# Patient Record
Sex: FEMALE | Race: WHITE | NOT HISPANIC OR LATINO | Employment: OTHER | URBAN - NONMETROPOLITAN AREA
[De-identification: names, ages, dates, MRNs, and addresses within clinical notes are randomized per-mention and may not be internally consistent; named-entity substitution may affect disease eponyms.]

---

## 2017-01-09 ENCOUNTER — TRANSCRIBE ORDERS (OUTPATIENT)
Dept: ADMINISTRATIVE | Facility: HOSPITAL | Age: 62
End: 2017-01-09

## 2017-01-09 DIAGNOSIS — Z13.9 SCREENING: Primary | ICD-10-CM

## 2017-01-31 ENCOUNTER — APPOINTMENT (OUTPATIENT)
Dept: MAMMOGRAPHY | Facility: HOSPITAL | Age: 62
End: 2017-01-31

## 2017-10-31 ENCOUNTER — OFFICE VISIT (OUTPATIENT)
Dept: INTERNAL MEDICINE | Facility: CLINIC | Age: 62
End: 2017-10-31

## 2017-10-31 VITALS
RESPIRATION RATE: 12 BRPM | WEIGHT: 170 LBS | HEART RATE: 60 BPM | BODY MASS INDEX: 28.32 KG/M2 | SYSTOLIC BLOOD PRESSURE: 118 MMHG | TEMPERATURE: 99 F | OXYGEN SATURATION: 97 % | DIASTOLIC BLOOD PRESSURE: 70 MMHG | HEIGHT: 65 IN

## 2017-10-31 DIAGNOSIS — E27.40 ADRENAL INSUFFICIENCY (HCC): ICD-10-CM

## 2017-10-31 DIAGNOSIS — R53.83 FATIGUE, UNSPECIFIED TYPE: ICD-10-CM

## 2017-10-31 DIAGNOSIS — R00.1 BRADYCARDIA: Primary | ICD-10-CM

## 2017-10-31 DIAGNOSIS — R00.2 PALPITATIONS: ICD-10-CM

## 2017-10-31 DIAGNOSIS — R74.8 ELEVATED LIVER ENZYMES: ICD-10-CM

## 2017-10-31 DIAGNOSIS — Z12.39 BREAST CANCER SCREENING: ICD-10-CM

## 2017-10-31 PROCEDURE — 93000 ELECTROCARDIOGRAM COMPLETE: CPT | Performed by: FAMILY MEDICINE

## 2017-10-31 PROCEDURE — 93225 XTRNL ECG REC<48 HRS REC: CPT | Performed by: FAMILY MEDICINE

## 2017-10-31 PROCEDURE — 99204 OFFICE O/P NEW MOD 45 MIN: CPT | Performed by: FAMILY MEDICINE

## 2017-11-01 ENCOUNTER — CLINICAL SUPPORT (OUTPATIENT)
Dept: INTERNAL MEDICINE | Facility: CLINIC | Age: 62
End: 2017-11-01

## 2017-11-01 ENCOUNTER — FLU SHOT (OUTPATIENT)
Dept: INTERNAL MEDICINE | Facility: CLINIC | Age: 62
End: 2017-11-01

## 2017-11-01 DIAGNOSIS — Z23 NEED FOR INFLUENZA VACCINE: ICD-10-CM

## 2017-11-01 DIAGNOSIS — R00.1 BRADYCARDIA: ICD-10-CM

## 2017-11-01 PROCEDURE — 90686 IIV4 VACC NO PRSV 0.5 ML IM: CPT | Performed by: FAMILY MEDICINE

## 2017-11-01 PROCEDURE — 90471 IMMUNIZATION ADMIN: CPT | Performed by: FAMILY MEDICINE

## 2017-11-03 LAB
ALBUMIN SERPL-MCNC: 4.3 G/DL (ref 3.5–5)
ALBUMIN/GLOB SERPL: 2 G/DL (ref 1–2)
ALDOST SERPL-MCNC: 2.5 NG/DL (ref 0–30)
ALDOST/RENIN PLAS-RTO: 12.3 {RATIO} (ref 0–30)
ALP SERPL-CCNC: 74 U/L (ref 38–126)
ALT SERPL-CCNC: 82 U/L (ref 13–69)
ANA SER QL: NEGATIVE
AST SERPL-CCNC: 54 U/L (ref 15–46)
BASOPHILS # BLD AUTO: 0.04 10*3/MM3 (ref 0–0.2)
BASOPHILS NFR BLD AUTO: 0.7 % (ref 0–2.5)
BILIRUB SERPL-MCNC: 0.5 MG/DL (ref 0.2–1.3)
BUN SERPL-MCNC: 12 MG/DL (ref 7–20)
BUN/CREAT SERPL: 17.1 (ref 7.1–23.5)
CALCIUM SERPL-MCNC: 10.2 MG/DL (ref 8.4–10.2)
CHLORIDE SERPL-SCNC: 107 MMOL/L (ref 98–107)
CO2 SERPL-SCNC: 27 MMOL/L (ref 26–30)
CREAT SERPL-MCNC: 0.7 MG/DL (ref 0.6–1.3)
CRP SERPL-MCNC: <0.5 MG/DL (ref 0–1)
EOSINOPHIL # BLD AUTO: 0.08 10*3/MM3 (ref 0–0.7)
EOSINOPHIL NFR BLD AUTO: 1.4 % (ref 0–7)
ERYTHROCYTE [DISTWIDTH] IN BLOOD BY AUTOMATED COUNT: 12.6 % (ref 11.5–14.5)
ERYTHROCYTE [SEDIMENTATION RATE] IN BLOOD BY WESTERGREN METHOD: 3 MM/HR (ref 0–20)
FOLATE SERPL-MCNC: 19.2 NG/ML
GFR SERPLBLD CREATININE-BSD FMLA CKD-EPI: 103 ML/MIN/1.73
GFR SERPLBLD CREATININE-BSD FMLA CKD-EPI: 85 ML/MIN/1.73
GLOBULIN SER CALC-MCNC: 2.2 GM/DL
GLUCOSE SERPL-MCNC: 94 MG/DL (ref 74–98)
HCT VFR BLD AUTO: 41.1 % (ref 37–47)
HGB BLD-MCNC: 13.8 G/DL (ref 12–16)
IMM GRANULOCYTES # BLD: 0.01 10*3/MM3 (ref 0–0.06)
IMM GRANULOCYTES NFR BLD: 0.2 % (ref 0–0.6)
INTACT PTH: NORMAL
LYMPHOCYTES # BLD AUTO: 1.38 10*3/MM3 (ref 0.6–3.4)
LYMPHOCYTES NFR BLD AUTO: 23.5 % (ref 10–50)
MAGNESIUM SERPL-MCNC: 2.4 MG/DL (ref 1.6–2.3)
MCH RBC QN AUTO: 30.2 PG (ref 27–31)
MCHC RBC AUTO-ENTMCNC: 33.6 G/DL (ref 30–37)
MCV RBC AUTO: 89.9 FL (ref 81–99)
MONOCYTES # BLD AUTO: 0.38 10*3/MM3 (ref 0–0.9)
MONOCYTES NFR BLD AUTO: 6.5 % (ref 0–12)
NEUTROPHILS # BLD AUTO: 3.97 10*3/MM3 (ref 2–6.9)
NEUTROPHILS NFR BLD AUTO: 67.7 % (ref 37–80)
NRBC BLD AUTO-RTO: 0 /100 WBC (ref 0–0)
PHOSPHATE SERPL-MCNC: 3.9 MG/DL (ref 2.5–4.5)
PLATELET # BLD AUTO: 215 10*3/MM3 (ref 130–400)
POTASSIUM SERPL-SCNC: 4.3 MMOL/L (ref 3.5–5.1)
PROT SERPL-MCNC: 6.5 G/DL (ref 6.3–8.2)
PTH-INTACT SERPL-MCNC: 39 PG/ML (ref 15–65)
RBC # BLD AUTO: 4.57 10*6/MM3 (ref 4.2–5.4)
RENIN PLAS-CCNC: 0.2 NG/ML/HR (ref 0.17–5.38)
SODIUM SERPL-SCNC: 143 MMOL/L (ref 137–145)
T3 SERPL-MCNC: 83 NG/DL (ref 71–180)
T4 FREE SERPL-MCNC: 1.11 NG/DL (ref 0.78–2.19)
TSH RECEP AB SER-ACNC: <0.5 IU/L (ref 0–1.75)
TSH SERPL DL<=0.005 MIU/L-ACNC: 1.11 MIU/ML (ref 0.47–4.68)
VIT B12 SERPL-MCNC: 989 PG/ML (ref 239–931)
WBC # BLD AUTO: 5.86 10*3/MM3 (ref 4.8–10.8)

## 2017-11-05 ENCOUNTER — TELEPHONE (OUTPATIENT)
Dept: INTERNAL MEDICINE | Facility: CLINIC | Age: 62
End: 2017-11-05

## 2017-11-05 DIAGNOSIS — R74.01 TRANSAMINITIS: Primary | ICD-10-CM

## 2017-11-09 LAB
FERRITIN SERPL-MCNC: 51.6 NG/ML (ref 11.1–264)
HBV CORE AB SERPL QL IA: NEGATIVE
HBV CORE IGM SERPL QL IA: NEGATIVE
HBV E AB SERPL QL IA: NEGATIVE
HBV E AG SERPL QL IA: NEGATIVE
HBV SURFACE AB SER QL: REACTIVE
HBV SURFACE AG SERPL QL IA: NEGATIVE
IRON SATN MFR SERPL: 29 % SATURATION
IRON SATN MFR SERPL: 35 % (ref 11–46)
IRON SERPL-MCNC: 123 MCG/DL (ref 37–181)
IRON SERPL-MCNC: 123 UG/DL
TIBC SERPL-MCNC: 351 MCG/DL (ref 261–497)
TRANSFERRIN SERPL-MCNC: 306 MG/DL
UIBC SERPL-MCNC: 228 MCG/DL

## 2017-11-13 ENCOUNTER — HOSPITAL ENCOUNTER (OUTPATIENT)
Dept: ULTRASOUND IMAGING | Facility: HOSPITAL | Age: 62
Discharge: HOME | End: 2017-11-13
Attending: FAMILY MEDICINE | Admitting: FAMILY MEDICINE

## 2017-11-13 DIAGNOSIS — R74.01 TRANSAMINITIS: ICD-10-CM

## 2017-11-13 PROCEDURE — 76705 ECHO EXAM OF ABDOMEN: CPT

## 2017-11-20 DIAGNOSIS — R16.0 LIVER MASS: Primary | ICD-10-CM

## 2017-11-20 PROCEDURE — 93227 XTRNL ECG REC<48 HR R&I: CPT | Performed by: FAMILY MEDICINE

## 2017-11-27 ENCOUNTER — OFFICE VISIT (OUTPATIENT)
Dept: INTERNAL MEDICINE | Facility: CLINIC | Age: 62
End: 2017-11-27

## 2017-11-27 VITALS
HEART RATE: 60 BPM | RESPIRATION RATE: 12 BRPM | TEMPERATURE: 98.2 F | WEIGHT: 171 LBS | BODY MASS INDEX: 28.49 KG/M2 | SYSTOLIC BLOOD PRESSURE: 118 MMHG | DIASTOLIC BLOOD PRESSURE: 70 MMHG | HEIGHT: 65 IN | OXYGEN SATURATION: 96 %

## 2017-11-27 DIAGNOSIS — R16.0 LIVER MASS: ICD-10-CM

## 2017-11-27 DIAGNOSIS — R94.31 ABNORMAL HOLTER MONITOR FINDING: ICD-10-CM

## 2017-11-27 DIAGNOSIS — R00.9 ABNORMAL HEART RATE: ICD-10-CM

## 2017-11-27 DIAGNOSIS — I47.1 SVT (SUPRAVENTRICULAR TACHYCARDIA) (HCC): ICD-10-CM

## 2017-11-27 DIAGNOSIS — J30.89 CHRONIC NON-SEASONAL ALLERGIC RHINITIS, UNSPECIFIED TRIGGER: Primary | ICD-10-CM

## 2017-11-27 PROCEDURE — 99214 OFFICE O/P EST MOD 30 MIN: CPT | Performed by: FAMILY MEDICINE

## 2017-11-27 NOTE — PROGRESS NOTES
Subjective    Emmie Ji is a 62 y.o. female here for:  Chief Complaint   Patient presents with   • Follow-up     4 week follow up on palpitations, fatigue and slow heartrate   • Slow Heart Rate     History of Present Illness   Continues to have high and low heart rates. She's measured in 40s at times at rest. She also feels palpitations at times. She reports a strong family history of heart disease.     Patient also is hesitant to have her CT liver. She is hesitant due to radiation exposure. Has history of elevated liver enzymes, but no known mass prior to recent ultrasound. Negative hepatitis.     Also notes a runny nose that is relieved somewhat by rhinocort over the counter that she does on occasion. She has not used on a consistent basis daily but has need for it year round with allergies to outdoor and indoor allergens.     The following portions of the patient's history were reviewed and updated as appropriate: allergies, current medications, past family history, past medical history, past social history, past surgical history and problem list.    Review of Systems   Constitutional: Positive for fatigue.   Cardiovascular: Positive for palpitations.   Gastrointestinal: Negative for abdominal pain.       Vitals:    11/27/17 0904   BP: 118/70   Pulse: 60   Resp: 12   Temp: 98.2 °F (36.8 °C)   SpO2: 96%       Objective   Physical Exam   Constitutional: She is oriented to person, place, and time. Vital signs are normal. She appears well-developed and well-nourished. She is active. She does not have a sickly appearance. She does not appear ill.   Appears stated age. Well groomed.    HENT:   Head: Normocephalic and atraumatic. Hair is normal.   Right Ear: Hearing normal.   Left Ear: Hearing normal.   Nose: Nose normal.   Eyes: EOM and lids are normal. Pupils are equal, round, and reactive to light. No scleral icterus.   Neck: Phonation normal. Neck supple.   Cardiovascular: Regular rhythm and normal heart  sounds.  Bradycardia present.  Exam reveals no gallop and no friction rub.    No murmur heard.  Pulmonary/Chest: Effort normal and breath sounds normal.   Musculoskeletal: She exhibits no deformity.   Neurological: She is alert and oriented to person, place, and time. She displays no tremor. No cranial nerve deficit. Gait normal.   Skin: Skin is warm. No rash noted. She is not diaphoretic. No cyanosis. Nails show no clubbing.   Psychiatric: She has a normal mood and affect. Her speech is normal and behavior is normal. Judgment and thought content normal. Cognition and memory are normal.   Nursing note and vitals reviewed.    Lab Results   Component Value Date    TSH 1.110 10/31/2017       Chemistry        Component Value Date/Time     10/31/2017 1444    K 4.3 10/31/2017 1444     10/31/2017 1444    CO2 27.0 10/31/2017 1444    BUN 12 10/31/2017 1444    CREATININE 0.70 10/31/2017 1444    GLU 94 10/31/2017 1444        Component Value Date/Time    CALCIUM 10.2 10/31/2017 1444    ALKPHOS 74 10/31/2017 1444    AST 54 (H) 10/31/2017 1444    ALT 82 (H) 10/31/2017 1444    BILITOT 0.5 10/31/2017 1444        Lab Results   Component Value Date    WBC 5.86 10/31/2017    HGB 13.8 10/31/2017    HCT 41.1 10/31/2017    MCV 89.9 10/31/2017     10/31/2017       Assessment/Plan   Emmie was seen today for follow-up and slow heart rate.    Diagnoses and all orders for this visit:    Chronic non-seasonal allergic rhinitis, unspecified trigger  -     budesonide (RINOCORT AQUA) 32 MCG/ACT nasal spray; 1 spray into each nostril Daily.    SVT (supraventricular tachycardia)    Abnormal heart rate    Abnormal Holter monitor finding    Liver mass      Reviewed u/s that showed a hypoechoic liver lesion. Suggest patient proceed with the additional liver protocol imaging to rule out malignancy or potential to transform to malignancy. Referred to Dr. Gaspar, referral order is in a telephone encounter from earlier today. Discussed  Holter result with her, discussed a couple of runs of SVT.          Enid Alfaro MD

## 2017-12-05 ENCOUNTER — CONSULT (OUTPATIENT)
Dept: CARDIOLOGY | Facility: CLINIC | Age: 62
End: 2017-12-05

## 2017-12-05 ENCOUNTER — APPOINTMENT (OUTPATIENT)
Dept: CT IMAGING | Facility: HOSPITAL | Age: 62
End: 2017-12-05

## 2017-12-05 VITALS
HEIGHT: 65 IN | BODY MASS INDEX: 28.46 KG/M2 | OXYGEN SATURATION: 98 % | SYSTOLIC BLOOD PRESSURE: 112 MMHG | RESPIRATION RATE: 18 BRPM | DIASTOLIC BLOOD PRESSURE: 62 MMHG | WEIGHT: 170.8 LBS | HEART RATE: 64 BPM

## 2017-12-05 DIAGNOSIS — R00.2 PALPITATIONS: ICD-10-CM

## 2017-12-05 DIAGNOSIS — R00.1 BRADYCARDIA: ICD-10-CM

## 2017-12-05 DIAGNOSIS — R07.2 PRECORDIAL PAIN: Primary | ICD-10-CM

## 2017-12-05 DIAGNOSIS — E27.40: ICD-10-CM

## 2017-12-05 PROCEDURE — 99204 OFFICE O/P NEW MOD 45 MIN: CPT | Performed by: INTERNAL MEDICINE

## 2017-12-05 RX ORDER — UBIDECARENONE 100 MG
100 CAPSULE ORAL DAILY
COMMUNITY
End: 2022-08-25

## 2017-12-05 NOTE — PROGRESS NOTES
Subjective:     Encounter Date:12/05/2017      Patient ID: Emmie Ji is a 62 y.o. female.    Chief Complaint:Fatigue, bradycardia, chest discomfort and palpitations  HPI  This is a 62-year-old female patient with a history of adrenal insufficiency who presents to cardiology clinic with severe fatigue.  The patient indicates that her fatigue was evaluated by a Holter monitor which showed multiple episodes of bradycardia into the 40 beat per minute range.  This degree of bradycardia occurred during both waking hours as well as sleep.  She is not on any medication that would result in bradycardia.  The patient indicates that previous thyroid function studies have been normal.  She indicates that she was on hormone replacement therapy at one point after her diagnosis of adrenal insufficiency but is currently not on any medication.  She indicates that she felt much better when taking hormone replacement therapy.  The patient also reports having chest discomfort of approximately 3 years duration.  The discomfort occurs several times per week and will generally last 4-5 seconds per episode.  The discomfort is located in her central chest and has a pressure-like quality.  It does not radiate.  There is no associated symptoms.  The discomfort typically has a 4/10 intensity.  The discomfort is usually precipitated by physical activity and exertion and is generally relieved with rest.  The frequency and duration are quite variable.  She denies having shortness of breath.  There is no shortness of breath at rest or with activity.  She has no orthopnea PND or lower extremity edema.  Her palpitations are described as a sense that her heart is racing and fluttering.  This will generally lasts for no more than 5-6 seconds per episode.  The palpitations are much less frequent than her sense of fatigue.  Her Holter monitor showed 2 episodes of supraventricular tachycardia with heart rates up to 170 bpm.  She has some  dizziness but no syncope.  She reports some swelling in her fingers.  She indicates that she is cutting back on her caffeine intake.  She does not smoke or drink.  The following portions of the patient's history were reviewed and updated as appropriate: allergies, current medications, past family history, past medical history, past social history, past surgical history and problem  Review of Systems   Constitution: Positive for weakness and malaise/fatigue. Negative for chills, diaphoresis, fever, night sweats, weight gain and weight loss.   HENT: Negative for ear discharge, hearing loss, hoarse voice and nosebleeds.    Eyes: Negative for discharge, double vision, pain and photophobia.   Cardiovascular: Positive for chest pain, irregular heartbeat and palpitations. Negative for claudication, cyanosis, dyspnea on exertion, leg swelling, near-syncope, orthopnea, paroxysmal nocturnal dyspnea and syncope.   Respiratory: Negative for cough, hemoptysis, shortness of breath, sputum production and wheezing.    Endocrine: Negative for cold intolerance, heat intolerance, polydipsia, polyphagia and polyuria.   Hematologic/Lymphatic: Negative for adenopathy and bleeding problem. Does not bruise/bleed easily.   Skin: Negative for color change, flushing, itching and rash.   Musculoskeletal: Negative for muscle cramps, muscle weakness, myalgias and stiffness.   Gastrointestinal: Negative for abdominal pain, diarrhea, hematemesis, hematochezia, nausea and vomiting.   Genitourinary: Negative for dysuria, frequency and nocturia.   Neurological: Positive for dizziness. Negative for focal weakness, loss of balance, numbness, paresthesias and seizures.   Psychiatric/Behavioral: Negative for altered mental status, hallucinations and suicidal ideas.   Allergic/Immunologic: Negative for HIV exposure, hives and persistent infections.       Current Outpatient Prescriptions:   •  budesonide (RINOCORT AQUA) 32 MCG/ACT nasal spray, 1 spray into  "each nostril Daily., Disp: 3 bottle, Rfl: 3  •  coenzyme Q10 100 MG capsule, Take 100 mg by mouth Daily., Disp: , Rfl:      Objective:     Physical Exam   Constitutional: She is oriented to person, place, and time. She appears well-developed and well-nourished.   HENT:   Head: Normocephalic and atraumatic.   Mouth/Throat: Oropharynx is clear and moist.   Eyes: Conjunctivae and EOM are normal. Pupils are equal, round, and reactive to light. No scleral icterus.   Neck: Normal range of motion. Neck supple. No JVD present. No tracheal deviation present. No thyromegaly present.   Cardiovascular: Normal rate, regular rhythm, S1 normal, S2 normal, normal heart sounds, intact distal pulses and normal pulses.  PMI is not displaced.  Exam reveals no gallop and no friction rub.    No murmur heard.  Pulmonary/Chest: Effort normal and breath sounds normal. No respiratory distress. She has no wheezes. She has no rales.   Abdominal: Soft. Bowel sounds are normal. She exhibits no distension and no mass. There is no tenderness. There is no rebound and no guarding.   Musculoskeletal: Normal range of motion. She exhibits no edema or deformity.   Neurological: She is alert and oriented to person, place, and time. She displays normal reflexes. No cranial nerve deficit. Coordination normal.   Skin: Skin is warm and dry. No rash noted. No erythema.   Psychiatric: She has a normal mood and affect. Her behavior is normal. Thought content normal.     Blood pressure 112/62, pulse 64, resp. rate 18, height 165.1 cm (65\"), weight 77.5 kg (170 lb 12.8 oz), SpO2 98 %.   Lab Review:       Assessment:         1. Precordial pain  The patient's chest discomfort has features mostly atypical for coronary insufficiency.  She has little in the way of risk factors for premature coronary disease.  She has never had a stress test.  - Treadmill Stress Test    2. Palpitations  The patient appears to be experiencing both symptomatic supraventricular " tachycardia as well as bradycardia.  The bradycardia appears to be the most prominent feature.  I suspect there is an element of tachycardia-bradycardia syndrome.  - Adult Transthoracic Echo Complete W/ Cont if Necessary Per Protocol    3. 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  It will be difficult to treat her bradycardia until after she has been started on appropriate hormonal replacement therapy.  - Treadmill Stress Test  - Adult Transthoracic Echo Complete W/ Cont if Necessary Per Protocol    4. Adrenal cortex hypofunction  The patient indicates that she was diagnosed with decreased adrenal gland function several years ago.  At one point she was on hormone replacement therapy but now is on no therapy at all.  Some of her severe fatigue as well as bradycardia is likely related to this diagnosis.  Procedures     Plan:       I have recommended the patient see an endocrinologist to consider starting appropriate adrenal gland hormone replacement therapy.  If the patient's symptoms persist particularly with bradycardia after appropriate hormone therapy she may require placement of a permanent pacemaker.  In addition the patient may require a permanent pacemaker in order to treat her supraventricular tachycardia.  Most AV node blocking medications as well as antiarrhythmic drugs would exacerbate underlying bradycardia in this circumstance.  She may need a permanent pacemaker for backup support in order to use appropriate medical therapy for the tachycardia.  I have recommended a treadmill exercise stress test to evaluate her chest discomfort as well as an echocardiogram.  No changes in her medication profile have been made at today's visit.  Further recommendations will be predicated on the results of her outpatient testing.

## 2017-12-08 ENCOUNTER — APPOINTMENT (OUTPATIENT)
Dept: MAMMOGRAPHY | Facility: HOSPITAL | Age: 62
End: 2017-12-08

## 2017-12-08 LAB
BH CV ECHO MEAS - % IVS THICK: 27.3 %
BH CV ECHO MEAS - % LVPW THICK: 37.5 %
BH CV ECHO MEAS - AO MAX PG (FULL): 0.28 MMHG
BH CV ECHO MEAS - AO MAX PG: 7 MMHG
BH CV ECHO MEAS - AO MEAN PG (FULL): 1 MMHG
BH CV ECHO MEAS - AO MEAN PG: 3 MMHG
BH CV ECHO MEAS - AO ROOT AREA (BSA CORRECTED): 1.4
BH CV ECHO MEAS - AO ROOT AREA: 4.9 CM^2
BH CV ECHO MEAS - AO ROOT DIAM: 2.5 CM
BH CV ECHO MEAS - AO V2 MAX: 128.5 CM/SEC
BH CV ECHO MEAS - AO V2 MEAN: 87.6 CM/SEC
BH CV ECHO MEAS - AO V2 VTI: 25.5 CM
BH CV ECHO MEAS - AVA(I,A): 3.1 CM^2
BH CV ECHO MEAS - AVA(I,D): 3.1 CM^2
BH CV ECHO MEAS - AVA(V,A): 3.2 CM^2
BH CV ECHO MEAS - AVA(V,D): 3.2 CM^2
BH CV ECHO MEAS - BSA(HAYCOCK): 1.9 M^2
BH CV ECHO MEAS - BSA: 1.8 M^2
BH CV ECHO MEAS - BZI_BMI: 28.3 KILOGRAMS/M^2
BH CV ECHO MEAS - BZI_METRIC_HEIGHT: 165.1 CM
BH CV ECHO MEAS - BZI_METRIC_WEIGHT: 77.1 KG
BH CV ECHO MEAS - CONTRAST EF 4CH: 59.3 ML/M^2
BH CV ECHO MEAS - EDV(CUBED): 107.2 ML
BH CV ECHO MEAS - EDV(MOD-SP4): 113 ML
BH CV ECHO MEAS - EDV(TEICH): 104.9 ML
BH CV ECHO MEAS - EF(CUBED): 74.8 %
BH CV ECHO MEAS - EF(MOD-SP4): 59.3 %
BH CV ECHO MEAS - EF(TEICH): 66.6 %
BH CV ECHO MEAS - ESV(CUBED): 27 ML
BH CV ECHO MEAS - ESV(MOD-SP4): 46 ML
BH CV ECHO MEAS - ESV(TEICH): 35 ML
BH CV ECHO MEAS - FS: 36.8 %
BH CV ECHO MEAS - IVS/LVPW: 1.4
BH CV ECHO MEAS - IVSD: 1 CM
BH CV ECHO MEAS - IVSS: 1.4 CM
BH CV ECHO MEAS - LA DIMENSION: 3.9 CM
BH CV ECHO MEAS - LA/AO: 1.6
BH CV ECHO MEAS - LV DIASTOLIC VOL/BSA (35-75): 61.2 ML/M^2
BH CV ECHO MEAS - LV MASS(C)D: 156.1 GRAMS
BH CV ECHO MEAS - LV MASS(C)DI: 84.6 GRAMS/M^2
BH CV ECHO MEAS - LV MASS(C)S: 116.6 GRAMS
BH CV ECHO MEAS - LV MASS(C)SI: 63.1 GRAMS/M^2
BH CV ECHO MEAS - LV MAX PG: 6.7 MMHG
BH CV ECHO MEAS - LV MEAN PG: 2 MMHG
BH CV ECHO MEAS - LV SYSTOLIC VOL/BSA (12-30): 24.9 ML/M^2
BH CV ECHO MEAS - LV V1 MAX: 129.5 CM/SEC
BH CV ECHO MEAS - LV V1 MEAN: 67.1 CM/SEC
BH CV ECHO MEAS - LV V1 VTI: 25 CM
BH CV ECHO MEAS - LVIDD: 4.8 CM
BH CV ECHO MEAS - LVIDS: 3 CM
BH CV ECHO MEAS - LVLD AP4: 7.9 CM
BH CV ECHO MEAS - LVLS AP4: 6.6 CM
BH CV ECHO MEAS - LVOT AREA (M): 3.1 CM^2
BH CV ECHO MEAS - LVOT AREA: 3.1 CM^2
BH CV ECHO MEAS - LVOT DIAM: 2 CM
BH CV ECHO MEAS - LVPWD: 0.8 CM
BH CV ECHO MEAS - LVPWS: 1.1 CM
BH CV ECHO MEAS - MV A MAX VEL: 63.9 CM/SEC
BH CV ECHO MEAS - MV DEC SLOPE: 209.5 CM/SEC^2
BH CV ECHO MEAS - MV DEC TIME: 0.29 SEC
BH CV ECHO MEAS - MV E MAX VEL: 68.1 CM/SEC
BH CV ECHO MEAS - MV E/A: 1.1
BH CV ECHO MEAS - MV P1/2T MAX VEL: 72.9 CM/SEC
BH CV ECHO MEAS - MV P1/2T: 101.9 MSEC
BH CV ECHO MEAS - MVA P1/2T LCG: 3 CM^2
BH CV ECHO MEAS - MVA(P1/2T): 2.2 CM^2
BH CV ECHO MEAS - PA MAX PG: 3.2 MMHG
BH CV ECHO MEAS - PA V2 MAX: 89.4 CM/SEC
BH CV ECHO MEAS - RAP SYSTOLE: 10 MMHG
BH CV ECHO MEAS - RVSP: 33 MMHG
BH CV ECHO MEAS - SI(AO): 67.8 ML/M^2
BH CV ECHO MEAS - SI(CUBED): 43.4 ML/M^2
BH CV ECHO MEAS - SI(LVOT): 42.5 ML/M^2
BH CV ECHO MEAS - SI(MOD-SP4): 36.3 ML/M^2
BH CV ECHO MEAS - SI(TEICH): 37.9 ML/M^2
BH CV ECHO MEAS - SV(AO): 125.2 ML
BH CV ECHO MEAS - SV(CUBED): 80.2 ML
BH CV ECHO MEAS - SV(LVOT): 78.5 ML
BH CV ECHO MEAS - SV(MOD-SP4): 67 ML
BH CV ECHO MEAS - SV(TEICH): 69.9 ML
BH CV ECHO MEAS - TR MAX VEL: 241 CM/SEC
BH CV ECHO MEAS - TV MAX PG: 0.55 MMHG
BH CV ECHO MEAS - TV V2 MAX: 37.1 CM/SEC
BH CV STRESS DURATION MIN STAGE 1: 3
BH CV STRESS DURATION SEC STAGE 1: 0
BH CV STRESS GRADE STAGE 1: 10
BH CV STRESS METS STAGE 1: 5
BH CV STRESS PROTOCOL 1: ABNORMAL
BH CV STRESS RECOVERY BP: ABNORMAL MMHG
BH CV STRESS RECOVERY HR: 65 BPM
BH CV STRESS RECOVERY O2: 99 %
BH CV STRESS SPEED STAGE 1: 1.7
BH CV STRESS STAGE 1: 1
LEFT ATRIUM VOLUME INDEX: 25 ML/M2
LV EF 2D ECHO EST: 60 %
MAXIMAL PREDICTED HEART RATE: 158 BPM
PERCENT MAX PREDICTED HR: 87.34 %
STRESS BASELINE BP: ABNORMAL MMHG
STRESS BASELINE HR: 61 BPM
STRESS O2 SAT REST: 99 %
STRESS PERCENT HR: 103 %
STRESS POST ESTIMATED WORKLOAD: 10.1 METS
STRESS POST EXERCISE DUR MIN: 9 MIN
STRESS POST O2 SAT PEAK: 97 %
STRESS POST PEAK BP: ABNORMAL MMHG
STRESS POST PEAK HR: 138 BPM
STRESS TARGET HR: 134 BPM

## 2017-12-09 ENCOUNTER — HOSPITAL ENCOUNTER (OUTPATIENT)
Dept: MAMMOGRAPHY | Facility: HOSPITAL | Age: 62
Discharge: HOME | End: 2017-12-09
Admitting: FAMILY MEDICINE

## 2017-12-09 DIAGNOSIS — Z12.39 BREAST CANCER SCREENING: ICD-10-CM

## 2017-12-09 PROCEDURE — 77063 BREAST TOMOSYNTHESIS BI: CPT

## 2017-12-09 PROCEDURE — G0202 SCR MAMMO BI INCL CAD: HCPCS

## 2017-12-11 ENCOUNTER — TELEPHONE (OUTPATIENT)
Dept: INTERNAL MEDICINE | Facility: CLINIC | Age: 62
End: 2017-12-11

## 2017-12-11 DIAGNOSIS — E27.40: Primary | ICD-10-CM

## 2017-12-11 DIAGNOSIS — R00.1 BRADYCARDIA: ICD-10-CM

## 2017-12-11 DIAGNOSIS — R07.2 PRECORDIAL PAIN: Primary | ICD-10-CM

## 2017-12-11 DIAGNOSIS — R00.2 PALPITATIONS: ICD-10-CM

## 2017-12-11 NOTE — TELEPHONE ENCOUNTER
Endo order in. Does she wish to do the cardiac testing already ordered first or get a second opinion first (cardiology)?

## 2017-12-11 NOTE — TELEPHONE ENCOUNTER
Per Dr. Gaspar, patient needs a referral to see an Endocrinologist.     She also wants a second opinion for Cardiology, wants to stay within [facility].

## 2017-12-12 ENCOUNTER — APPOINTMENT (OUTPATIENT)
Dept: CT IMAGING | Facility: HOSPITAL | Age: 62
End: 2017-12-12

## 2017-12-28 ENCOUNTER — APPOINTMENT (OUTPATIENT)
Dept: CT IMAGING | Facility: HOSPITAL | Age: 62
End: 2017-12-28

## 2018-01-08 ENCOUNTER — CONSULT (OUTPATIENT)
Dept: CARDIOLOGY | Facility: CLINIC | Age: 63
End: 2018-01-08

## 2018-01-08 VITALS
HEART RATE: 59 BPM | SYSTOLIC BLOOD PRESSURE: 130 MMHG | HEIGHT: 65 IN | DIASTOLIC BLOOD PRESSURE: 68 MMHG | WEIGHT: 169 LBS | BODY MASS INDEX: 28.16 KG/M2

## 2018-01-08 DIAGNOSIS — E78.2 MIXED HYPERLIPIDEMIA: Primary | ICD-10-CM

## 2018-01-08 DIAGNOSIS — R00.2 PALPITATIONS: ICD-10-CM

## 2018-01-08 DIAGNOSIS — R07.2 PRECORDIAL PAIN: ICD-10-CM

## 2018-01-08 DIAGNOSIS — R00.1 BRADYCARDIA: ICD-10-CM

## 2018-01-08 PROCEDURE — 99213 OFFICE O/P EST LOW 20 MIN: CPT | Performed by: INTERNAL MEDICINE

## 2018-01-08 PROCEDURE — 93000 ELECTROCARDIOGRAM COMPLETE: CPT | Performed by: INTERNAL MEDICINE

## 2018-01-08 RX ORDER — VITAMIN B COMPLEX
CAPSULE ORAL DAILY
COMMUNITY
End: 2018-03-16

## 2018-01-08 NOTE — PROGRESS NOTES
[facility]  Office visit  Emmie Ji  1955  [phone]  [phone]  VISIT DATE:  01/08/2018    PCP: Enid Alfaro MD  [address] [place] [id number]    CC:  Chief Complaint   Patient presents with   • Chest Pain       ASSESSMENT:   Diagnosis Plan   1. Mixed hyperlipidemia     2. Palpitations     3. Bradycardia     4. Precordial pain  Adult Stress Echo W/ Cont or Stress Agent if Necessary Per Protocol       PLAN:  -Stress echocardiogram pending to evaluate chest discomfort and recent electrically abnormal stress test  -Endocrinology evaluation pending  -Agree with Dr. barakat regarding potential underlying sick sinus physiology which appears relatively asymptomatic.  We'll need clinical follow-up..  Difficult to predict if she will progress to the point of requiring permanent pacing.  Agree that any potential underlying endocrine issues need to be addressed.    Subjective  62-year-old female who is previously diagnosed with adrenal insufficiency via cortisol saliva samples.  Had been on homeopathic adrenal supplementation previously.  Reports that she has not felt good since her complete hysterectomy 2001.  Intermittent fatigue.  Intermittent chest discomfort which she feels is a mild pressure sensation in her precordium.  This is usually brought on by emotional stress.  She leads an active lifestyle, never has chest discomfort with exertion.  Reviewed most recent cardiac testing to include Holter monitor, exercise treadmill test and transthoracic echocardiogram.  Blood pressures run less than 140/90 mmHg.  Dr. barakat recommended a Lexiscan myocardial perfusion imaging after her exercise stress test which revealed 1 mm of ST depression.  He was concerned for a false positive electrical response and a middle-aged female which I completely agree with.  She is very concerned about the potential negative health risk of an exposure to radiation.  We  "potentially discussed stress echocardiography.  I did review her transthoracic echo imaging which at times is sub-optimal for definitive endocardial definition.  She may require IV echo contrast during a stress echocardiogram.  She was even nervous about having IV echo contrast injected.  Stressed the overall safety of this intervention.  It appears she has at least initially agreed to proceed with further evaluation with stress echocardiogram.      PHYSICAL EXAMINATION:  Vitals:    01/08/18 0904   BP: 130/68   BP Location: Left arm   Patient Position: Sitting   Pulse: 59   Weight: 76.7 kg (169 lb)   Height: 165.1 cm (65\")     General Appearance:    Alert, cooperative, no distress, appears stated age   Head:    Normocephalic, without obvious abnormality, atraumatic   Eyes:    conjunctiva/corneas clear   Nose:   Nares normal, septum midline, mucosa normal, no drainage   Throat:   Lips, teeth and gums normal   Neck:   Supple, symmetrical, trachea midline, no carotid    bruit or JVD   Lungs:     Clear to auscultation bilaterally, respirations unlabored   Chest Wall:    No tenderness or deformity    Heart:    Regular rate and rhythm, S1 and S2 normal, no murmur, rub   or gallop, normal carotid impulse bilaterally without bruit.   Abdomen:     Soft, non-tender   Extremities:   Extremities normal, atraumatic, no cyanosis or edema   Pulses:   2+ and symmetric all extremities   Skin:   Skin color, texture, turgor normal, no rashes or lesions       Diagnostic Data:    ECG 12 Lead  Date/Time: 1/8/2018 9:36 AM  Performed by: BAN DEAN III  Authorized by: BAN DEAN III   Previous ECG: no previous ECG available  Rhythm: sinus bradycardia  Rate: bradycardic  Clinical impression: non-specific ECG          No results found for: CHLPL, TRIG, HDL, LDLDIRECT  Lab Results   Component Value Date    BUN 12 10/31/2017    CREATININE 0.70 10/31/2017     10/31/2017    K 4.3 10/31/2017     10/31/2017    CO2 27.0 10/31/2017 "     No results found for: HGBA1C  Lab Results   Component Value Date    WBC 5.86 10/31/2017    HGB 13.8 10/31/2017    HCT 41.1 10/31/2017     10/31/2017       Allergies  Allergies   Allergen Reactions   • Demerol [Meperidine]    • Erythromycin    • Morphine And Related    • Penicillins    • Percocet [Oxycodone-Acetaminophen]    • Sulfa Antibiotics    • Epinephrine Anxiety       Current Medications    Current Outpatient Prescriptions:   •  B Complex Vitamins (VITAMIN B COMPLEX) capsule capsule, Take  by mouth Daily., Disp: , Rfl:   •  Cholecalciferol (VITAMIN D3) 5000 units capsule capsule, Take 5,000 Units by mouth Daily., Disp: , Rfl:   •  coenzyme Q10 100 MG capsule, Take 100 mg by mouth Daily., Disp: , Rfl:   •  Multiple Vitamin (MULTI VITAMIN DAILY PO), Take  by mouth Daily., Disp: , Rfl:           ROS  Review of Systems   Cardiovascular: Positive for chest pain. Negative for dyspnea on exertion, irregular heartbeat and palpitations.   Respiratory: Positive for shortness of breath.      SOCIAL HX  Social History     Social History   • Marital status:      Spouse name: N/A   • Number of children: N/A   • Years of education: N/A     Occupational History   • Not on file.     Social History Main Topics   • Smoking status: Never Smoker   • Smokeless tobacco: Never Used   • Alcohol use No   • Drug use: No   • Sexual activity: No     Other Topics Concern   • Not on file     Social History Narrative       FAMILY HX  Family History   Problem Relation Age of Onset   • Heart attack Mother    • Cancer Mother      bladder   • Hypertension Mother    • Hyperlipidemia Mother    • Glaucoma Father    • Bradycardia Father    • Rheum arthritis Sister    • Mental illness Brother      schizophrenia   • Tuberculosis Son    • Stroke Paternal Grandmother    • Thyroid disease Paternal Grandmother    • Osteoporosis Sister    • Migraines Sister    • Mental illness Sister      schizophrenia   • Cancer Sister 43     breast   •  Liver disease Sister      SAGE   • Obesity Sister    • Cancer Sister    • Liver disease Sister    • Mental illness Sister    • Mental illness Brother              Avelino Pizarro III, MD, FACC

## 2018-01-26 PROBLEM — E27.40 ADRENAL INSUFFICIENCY (HCC): Status: ACTIVE | Noted: 2018-01-26

## 2018-01-29 ENCOUNTER — HOSPITAL ENCOUNTER (OUTPATIENT)
Dept: CARDIOLOGY | Facility: HOSPITAL | Age: 63
Discharge: HOME | End: 2018-01-29
Attending: INTERNAL MEDICINE | Admitting: INTERNAL MEDICINE

## 2018-01-29 DIAGNOSIS — R07.2 PRECORDIAL PAIN: ICD-10-CM

## 2018-01-29 LAB
BH CV ECHO MEAS - BSA(HAYCOCK): 1.9 M^2
BH CV ECHO MEAS - BSA: 1.8 M^2
BH CV ECHO MEAS - BZI_BMI: 27.5 KILOGRAMS/M^2
BH CV ECHO MEAS - BZI_METRIC_HEIGHT: 165.1 CM
BH CV ECHO MEAS - BZI_METRIC_WEIGHT: 74.8 KG
BH CV STRESS BP STAGE 1: NORMAL
BH CV STRESS BP STAGE 2: NORMAL
BH CV STRESS BP STAGE 3: NORMAL
BH CV STRESS BP STAGE 4: NORMAL
BH CV STRESS DURATION MIN STAGE 1: 3
BH CV STRESS DURATION MIN STAGE 2: 3
BH CV STRESS DURATION MIN STAGE 3: 3
BH CV STRESS DURATION SEC STAGE 1: 0
BH CV STRESS DURATION SEC STAGE 2: 0
BH CV STRESS DURATION SEC STAGE 3: 0
BH CV STRESS DURATION SEC STAGE 4: 21
BH CV STRESS GRADE STAGE 1: 10
BH CV STRESS GRADE STAGE 2: 12
BH CV STRESS GRADE STAGE 3: 14
BH CV STRESS GRADE STAGE 4: 16
BH CV STRESS HR STAGE 1: 93
BH CV STRESS HR STAGE 2: 105
BH CV STRESS HR STAGE 3: 134
BH CV STRESS HR STAGE 4: 144
BH CV STRESS METS STAGE 1: 5
BH CV STRESS METS STAGE 2: 7.5
BH CV STRESS METS STAGE 3: 10
BH CV STRESS METS STAGE 4: 13.5
BH CV STRESS PROTOCOL 1: NORMAL
BH CV STRESS RECOVERY BP: NORMAL MMHG
BH CV STRESS RECOVERY HR: 70 BPM
BH CV STRESS SPEED STAGE 1: 1.7
BH CV STRESS SPEED STAGE 2: 2.5
BH CV STRESS SPEED STAGE 3: 3.4
BH CV STRESS SPEED STAGE 4: 4.2
BH CV STRESS STAGE 1: 1
BH CV STRESS STAGE 2: 2
BH CV STRESS STAGE 3: 3
BH CV STRESS STAGE 4: 4
BH CV VAS BP RIGHT ARM: NORMAL MMHG
MAXIMAL PREDICTED HEART RATE: 158 BPM
PERCENT MAX PREDICTED HR: 92.41 %
STRESS BASELINE BP: NORMAL MMHG
STRESS BASELINE HR: 60 BPM
STRESS PERCENT HR: 109 %
STRESS POST ESTIMATED WORKLOAD: 11.2 METS
STRESS POST EXERCISE DUR MIN: 9 MIN
STRESS POST EXERCISE DUR SEC: 21 SEC
STRESS POST PEAK BP: NORMAL MMHG
STRESS POST PEAK HR: 146 BPM
STRESS TARGET HR: 134 BPM

## 2018-01-29 PROCEDURE — 93320 DOPPLER ECHO COMPLETE: CPT

## 2018-01-29 PROCEDURE — 93350 STRESS TTE ONLY: CPT | Performed by: INTERNAL MEDICINE

## 2018-01-29 PROCEDURE — 93017 CV STRESS TEST TRACING ONLY: CPT

## 2018-01-29 PROCEDURE — 93325 DOPPLER ECHO COLOR FLOW MAPG: CPT

## 2018-01-29 PROCEDURE — 93350 STRESS TTE ONLY: CPT

## 2018-01-29 PROCEDURE — 93018 CV STRESS TEST I&R ONLY: CPT | Performed by: INTERNAL MEDICINE

## 2018-01-30 ENCOUNTER — HOSPITAL ENCOUNTER (OUTPATIENT)
Dept: INFUSION THERAPY | Facility: HOSPITAL | Age: 63
Setting detail: INFUSION SERIES
Discharge: HOME | End: 2018-01-30

## 2018-01-30 VITALS
HEART RATE: 55 BPM | TEMPERATURE: 97.9 F | OXYGEN SATURATION: 97 % | SYSTOLIC BLOOD PRESSURE: 130 MMHG | RESPIRATION RATE: 18 BRPM | DIASTOLIC BLOOD PRESSURE: 69 MMHG

## 2018-01-30 VITALS — WEIGHT: 165 LBS | BODY MASS INDEX: 27.49 KG/M2 | HEIGHT: 65 IN

## 2018-01-30 DIAGNOSIS — E27.40 ADRENAL INSUFFICIENCY (HCC): ICD-10-CM

## 2018-01-30 PROCEDURE — 82024 ASSAY OF ACTH: CPT | Performed by: INTERNAL MEDICINE

## 2018-01-30 PROCEDURE — 25010000002 COSYNTROPIN PER 0.25 MG: Performed by: INTERNAL MEDICINE

## 2018-01-30 PROCEDURE — 96374 THER/PROPH/DIAG INJ IV PUSH: CPT

## 2018-01-30 PROCEDURE — 82533 TOTAL CORTISOL: CPT | Performed by: INTERNAL MEDICINE

## 2018-01-30 PROCEDURE — 36415 COLL VENOUS BLD VENIPUNCTURE: CPT

## 2018-01-30 RX ORDER — AMPICILLIN TRIHYDRATE 250 MG
600 CAPSULE ORAL DAILY
COMMUNITY
End: 2018-03-16

## 2018-01-30 RX ORDER — COSYNTROPIN 0.25 MG/ML
0.25 INJECTION, POWDER, FOR SOLUTION INTRAMUSCULAR; INTRAVENOUS ONCE
Status: CANCELLED | OUTPATIENT
Start: 2018-01-30

## 2018-01-30 RX ORDER — COSYNTROPIN 0.25 MG/ML
0.25 INJECTION, POWDER, FOR SOLUTION INTRAMUSCULAR; INTRAVENOUS ONCE
Status: COMPLETED | OUTPATIENT
Start: 2018-01-30 | End: 2018-01-30

## 2018-01-30 RX ORDER — COSYNTROPIN 0.25 MG/ML
0.25 INJECTION, POWDER, FOR SOLUTION INTRAMUSCULAR; INTRAVENOUS ONCE
Status: DISCONTINUED | OUTPATIENT
Start: 2018-01-30 | End: 2018-01-30 | Stop reason: SDUPTHER

## 2018-01-30 RX ORDER — SODIUM CHLORIDE 9 MG/ML
250 INJECTION, SOLUTION INTRAVENOUS ONCE
Status: DISCONTINUED | OUTPATIENT
Start: 2018-01-30 | End: 2018-02-01 | Stop reason: HOSPADM

## 2018-01-30 RX ORDER — SODIUM CHLORIDE 9 MG/ML
250 INJECTION, SOLUTION INTRAVENOUS ONCE
Status: CANCELLED | OUTPATIENT
Start: 2018-01-30

## 2018-01-30 RX ADMIN — COSYNTROPIN 0.25 MG: 0.25 INJECTION, POWDER, LYOPHILIZED, FOR SOLUTION INTRAMUSCULAR; INTRAVENOUS at 09:56

## 2018-01-31 LAB
ACTH PLAS-MCNC: 16.1 PG/ML (ref 7.2–63.3)
CORTIS SERPL-MCNC: 21.6 UG/DL
CORTIS SERPL-MCNC: 23.9 UG/DL
CORTIS SERPL-MCNC: 8 UG/DL

## 2018-02-14 ENCOUNTER — APPOINTMENT (OUTPATIENT)
Dept: LAB | Facility: HOSPITAL | Age: 63
End: 2018-02-14

## 2018-02-14 ENCOUNTER — TRANSCRIBE ORDERS (OUTPATIENT)
Dept: ADMINISTRATIVE | Facility: HOSPITAL | Age: 63
End: 2018-02-14

## 2018-02-14 DIAGNOSIS — E27.40 ADRENOCORTICAL INSUFFICIENCY (HCC): Primary | ICD-10-CM

## 2018-02-14 LAB
T4 FREE SERPL-MCNC: 1.02 NG/DL (ref 0.78–2.19)
TSH SERPL DL<=0.05 MIU/L-ACNC: 2.66 MIU/ML (ref 0.47–4.68)

## 2018-02-14 PROCEDURE — 84443 ASSAY THYROID STIM HORMONE: CPT | Performed by: INTERNAL MEDICINE

## 2018-02-14 PROCEDURE — 84480 ASSAY TRIIODOTHYRONINE (T3): CPT | Performed by: INTERNAL MEDICINE

## 2018-02-14 PROCEDURE — 84439 ASSAY OF FREE THYROXINE: CPT | Performed by: INTERNAL MEDICINE

## 2018-02-14 PROCEDURE — 36415 COLL VENOUS BLD VENIPUNCTURE: CPT | Performed by: INTERNAL MEDICINE

## 2018-02-15 LAB — T3 SERPL-MCNC: 96 NG/DL (ref 71–180)

## 2018-03-16 ENCOUNTER — OFFICE VISIT (OUTPATIENT)
Dept: INTERNAL MEDICINE | Facility: CLINIC | Age: 63
End: 2018-03-16

## 2018-03-16 VITALS
SYSTOLIC BLOOD PRESSURE: 120 MMHG | WEIGHT: 169 LBS | DIASTOLIC BLOOD PRESSURE: 72 MMHG | HEIGHT: 65 IN | RESPIRATION RATE: 12 BRPM | HEART RATE: 50 BPM | BODY MASS INDEX: 28.16 KG/M2 | OXYGEN SATURATION: 99 % | TEMPERATURE: 98.6 F

## 2018-03-16 DIAGNOSIS — Z23 NEED FOR ZOSTER VACCINE: ICD-10-CM

## 2018-03-16 DIAGNOSIS — E78.2 MIXED HYPERLIPIDEMIA: ICD-10-CM

## 2018-03-16 DIAGNOSIS — R00.1 BRADYCARDIA: Primary | ICD-10-CM

## 2018-03-16 PROCEDURE — 99213 OFFICE O/P EST LOW 20 MIN: CPT | Performed by: FAMILY MEDICINE

## 2018-03-16 RX ORDER — ASCORBIC ACID 500 MG
500 TABLET ORAL 2 TIMES DAILY
COMMUNITY

## 2018-03-16 NOTE — PROGRESS NOTES
"Subjective    Emmie Ji is a 62 y.o. female here for:  Chief Complaint   Patient presents with   • Hyperlipidemia     History of Present Illness     Saw endocrinology and ACTH was normal but cortisol was low. No treatment at this time.     Saw Dr. Pizarro regarding bradycardia, she has follow up scheduled. Heart rates at home have been in 60s. She's not feeling as bad but she's also not working quite as hard recently. Historically had been doing 12 hour days five days a week.     Not fasting this AM.     The following portions of the patient's history were reviewed and updated as appropriate: allergies, current medications, past family history, past medical history, past social history, past surgical history and problem list.    Review of Systems   Constitutional: Positive for activity change (less work recently) and fatigue (not as bad).   Cardiovascular: Negative for chest pain.       Vitals:    03/16/18 0755   BP: 120/72   Pulse: 50   Resp: 12   Temp: 98.6 °F (37 °C)   SpO2: 99%   Weight: 76.7 kg (169 lb)   Height: 165.1 cm (65\")         Objective   Physical Exam   Constitutional: She is oriented to person, place, and time. Vital signs are normal. She appears well-developed and well-nourished. She is active.  Non-toxic appearance. She does not have a sickly appearance. She does not appear ill. No distress. She appears overweight.   HENT:   Head: Normocephalic and atraumatic. Hair is normal.   Right Ear: Hearing and external ear normal.   Left Ear: Hearing and external ear normal.   Nose: Nose normal.   Mouth/Throat: Mucous membranes are not dry.   Eyes: EOM and lids are normal. Pupils are equal, round, and reactive to light. No scleral icterus.   Neck: Neck supple.   Cardiovascular: Normal rate, regular rhythm and normal heart sounds.    No murmur heard.  Pulmonary/Chest: Effort normal and breath sounds normal.   Musculoskeletal: She exhibits no edema or deformity.   Neurological: She is alert and oriented " to person, place, and time. She displays no tremor. No cranial nerve deficit. Gait normal.   Skin: Skin is warm and dry. No rash noted. She is not diaphoretic. No cyanosis. Nails show no clubbing.   Psychiatric: She has a normal mood and affect. Her speech is normal and behavior is normal. Judgment and thought content normal. Cognition and memory are normal.   Nursing note and vitals reviewed.        Assessment/Plan     Problem List Items Addressed This Visit        Cardiovascular and Mediastinum    Hyperlipidemia     Lipid abnormalities are unchanged.  Need to reassess lipids when she can have labs fasting.  Lipids will be reassessed when she can return for labs.   · Continue Co-Q10         Relevant Orders    Comprehensive Metabolic Panel    Lipid Panel    Bradycardia - Primary     · Follow up with Dr. Pizarro for likely sick sinus  · Monitor heart rates  · Avoid beta blockers         Relevant Orders    Comprehensive Metabolic Panel    Magnesium      Other Visit Diagnoses     Need for zoster vaccine        Relevant Medications    Zoster Vac Recomb Adjuvanted 50 MCG reconstituted suspension            · Prescription sent for Shingrix. Vaccination discussed in detail including efficacy compared to Zostavax and need for two injections two months apart. Discussed likely myalgias after vaccination as greater than 40% of patients complained of this in clinical trials. If not covered by insurance, recommend waiting until it is covered (if not affordable). Even if Zostavax was previously received, Shingrix is recommended.  ·     Return in about 6 months (around 9/16/2018) for Annual physical.    Enid Alfaro MD    Please note that portions of this note may have been completed with a voice recognition program. Efforts were made to edit dictation, but occasionally words are mistranscribed.

## 2018-03-16 NOTE — ASSESSMENT & PLAN NOTE
Lipid abnormalities are unchanged.  Need to reassess lipids when she can have labs fasting.  Lipids will be reassessed when she can return for labs.   · Continue Co-Q10

## 2018-04-05 ENCOUNTER — OFFICE VISIT (OUTPATIENT)
Dept: INTERNAL MEDICINE | Facility: CLINIC | Age: 63
End: 2018-04-05

## 2018-04-05 VITALS
TEMPERATURE: 98 F | HEART RATE: 59 BPM | SYSTOLIC BLOOD PRESSURE: 136 MMHG | BODY MASS INDEX: 28.16 KG/M2 | WEIGHT: 169 LBS | HEIGHT: 65 IN | DIASTOLIC BLOOD PRESSURE: 77 MMHG | OXYGEN SATURATION: 98 %

## 2018-04-05 DIAGNOSIS — J01.00 ACUTE MAXILLARY SINUSITIS, RECURRENCE NOT SPECIFIED: Primary | ICD-10-CM

## 2018-04-05 PROCEDURE — 99213 OFFICE O/P EST LOW 20 MIN: CPT | Performed by: PHYSICIAN ASSISTANT

## 2018-04-05 RX ORDER — CEFUROXIME AXETIL 250 MG/1
250 TABLET ORAL DAILY
Qty: 20 TABLET | Refills: 0 | Status: SHIPPED | OUTPATIENT
Start: 2018-04-05 | End: 2018-05-07

## 2018-04-05 NOTE — PROGRESS NOTES
Subjective     Chief Complaint: sinus pressure, congestion    History of Present Illness     Emmie Ji is a 62 y.o. female presenting with complaints of fatigue, body aches, sinus pressure and pain radiating to teeth, ear pain, cough, congestion for nearly one week. She's been pushing fluids, using mucinex and sambucol elderberry at home. Feels like she is getting worse. Denies fevers, SOA, CP.    The following portions of the patient's history were reviewed and updated as appropriate: current medications, allergies, PMH.    Review of Systems   Constitutional: Positive for fatigue. Negative for appetite change, chills, fever and unexpected weight change.   HENT: Positive for congestion, sinus pain and sinus pressure. Negative for ear pain, hearing loss, nosebleeds, sore throat, tinnitus and trouble swallowing.    Eyes: Negative for pain, discharge, redness, itching and visual disturbance.   Respiratory: Positive for cough. Negative for chest tightness, shortness of breath and wheezing.    Cardiovascular: Negative for chest pain, palpitations and leg swelling.   Gastrointestinal: Negative for abdominal pain, blood in stool, constipation, diarrhea, nausea and vomiting.   Endocrine: Negative for cold intolerance, heat intolerance, polydipsia, polyphagia and polyuria.   Genitourinary: Negative for decreased urine volume, dysuria, flank pain, frequency and hematuria.   Musculoskeletal: Positive for myalgias. Negative for arthralgias, back pain, gait problem, joint swelling, neck pain and neck stiffness.   Skin: Negative for color change and rash.   Allergic/Immunologic: Negative for environmental allergies, food allergies and immunocompromised state.   Neurological: Positive for headaches. Negative for dizziness, syncope, weakness and light-headedness.   Hematological: Negative for adenopathy. Does not bruise/bleed easily.   Psychiatric/Behavioral: Negative for dysphoric mood, sleep disturbance and suicidal ideas.  "The patient is not nervous/anxious.        Objective     Vitals:    04/05/18 1323   BP: 136/77   Pulse: 59   Temp: 98 °F (36.7 °C)   SpO2: 98%   Weight: 76.7 kg (169 lb)   Height: 165.1 cm (65\")     Physical Exam   Constitutional: She is oriented to person, place, and time. She appears well-developed and well-nourished.   HENT:   Right Ear: External ear and ear canal normal. Tympanic membrane is erythematous.   Left Ear: Tympanic membrane, external ear and ear canal normal.   Nose: Right sinus exhibits maxillary sinus tenderness. Left sinus exhibits maxillary sinus tenderness.   Mouth/Throat: Posterior oropharyngeal erythema present.   Eyes: EOM are normal. Pupils are equal, round, and reactive to light.   Neck: Normal range of motion. Neck supple.   Cardiovascular: Normal rate, regular rhythm and normal heart sounds.    Pulmonary/Chest: Effort normal and breath sounds normal.   Abdominal: Soft. Bowel sounds are normal.   Musculoskeletal: Normal range of motion.   Lymphadenopathy:     She has no cervical adenopathy.   Neurological: She is alert and oriented to person, place, and time.   Skin: Skin is warm and dry.   Psychiatric: She has a normal mood and affect.       Assessment/Plan     Diagnoses and all orders for this visit:    Acute maxillary sinusitis, recurrence not specified  -     cefuroxime (CEFTIN) 250 MG tablet; Take 1 tablet by mouth Daily.      Patient states she will give it a few days before beginning antibiotic, may improve on her own.  Continue staying well hydrated, mucinex, sinus rinses, tylenol if needed.    Carleen Kelsey PA-C  04/05/2018         Please note that portions of this note were completed with a voice recognition program. Efforts were made to edit dictation, but occasionally words are mistranscribed.    "

## 2018-04-09 LAB
ALBUMIN SERPL-MCNC: 3.9 G/DL (ref 3.5–5)
ALBUMIN/GLOB SERPL: 1.6 G/DL (ref 1–2)
ALP SERPL-CCNC: 73 U/L (ref 38–126)
ALT SERPL-CCNC: 55 U/L (ref 13–69)
AST SERPL-CCNC: 39 U/L (ref 15–46)
BILIRUB SERPL-MCNC: 0.4 MG/DL (ref 0.2–1.3)
BUN SERPL-MCNC: 16 MG/DL (ref 7–20)
BUN/CREAT SERPL: 20 (ref 7.1–23.5)
CALCIUM SERPL-MCNC: 9.8 MG/DL (ref 8.4–10.2)
CHLORIDE SERPL-SCNC: 107 MMOL/L (ref 98–107)
CHOLEST SERPL-MCNC: 224 MG/DL (ref 0–199)
CO2 SERPL-SCNC: 28 MMOL/L (ref 26–30)
CREAT SERPL-MCNC: 0.8 MG/DL (ref 0.6–1.3)
GFR SERPLBLD CREATININE-BSD FMLA CKD-EPI: 73 ML/MIN/1.73
GFR SERPLBLD CREATININE-BSD FMLA CKD-EPI: 88 ML/MIN/1.73
GLOBULIN SER CALC-MCNC: 2.4 GM/DL
GLUCOSE SERPL-MCNC: 93 MG/DL (ref 74–98)
HDLC SERPL-MCNC: 53 MG/DL (ref 40–60)
LDLC SERPL CALC-MCNC: 145 MG/DL (ref 0–99)
MAGNESIUM SERPL-MCNC: 2.3 MG/DL (ref 1.6–2.3)
POTASSIUM SERPL-SCNC: 4.5 MMOL/L (ref 3.5–5.1)
PROT SERPL-MCNC: 6.3 G/DL (ref 6.3–8.2)
SODIUM SERPL-SCNC: 144 MMOL/L (ref 137–145)
TRIGL SERPL-MCNC: 131 MG/DL
VLDLC SERPL CALC-MCNC: 26.2 MG/DL

## 2018-04-11 ENCOUNTER — OFFICE VISIT (OUTPATIENT)
Dept: CARDIOLOGY | Facility: CLINIC | Age: 63
End: 2018-04-11

## 2018-04-11 VITALS
BODY MASS INDEX: 27.82 KG/M2 | HEIGHT: 65 IN | DIASTOLIC BLOOD PRESSURE: 60 MMHG | SYSTOLIC BLOOD PRESSURE: 120 MMHG | WEIGHT: 167 LBS | HEART RATE: 67 BPM

## 2018-04-11 DIAGNOSIS — R00.1 BRADYCARDIA: ICD-10-CM

## 2018-04-11 DIAGNOSIS — R42 DIZZINESS AND GIDDINESS: ICD-10-CM

## 2018-04-11 DIAGNOSIS — R00.2 PALPITATIONS: ICD-10-CM

## 2018-04-11 DIAGNOSIS — E78.2 MIXED HYPERLIPIDEMIA: Primary | ICD-10-CM

## 2018-04-11 PROCEDURE — 99213 OFFICE O/P EST LOW 20 MIN: CPT | Performed by: INTERNAL MEDICINE

## 2018-04-11 NOTE — PROGRESS NOTES
"[facility] at [facility]  Office visit  Emmie Ji  1955  [phone]  [phone]  VISIT DATE:  01/08/2018    PCP: Enid Alfaro MD  [address] [place] [id number]    CC:  Chief Complaint   Patient presents with   • Palpitations   • Slow Heart Rate     Previous cardiac studies and procedures:  February 2017: Echo-normal, treadmill test-clinically and likely positive.  January 2018: stress echocardiogram-electrically positive, clinically negative, normal echocardiographic imaging, excellent functional capacity.    ASSESSMENT:   Diagnosis Plan   1. Mixed hyperlipidemia     2. Palpitations     3. Bradycardia         PLAN:  Recommended regular exercise  Avoidance of triggers for palpitations to include caffeine  Two-week ambulatory ECG monitor  Dietary and lifestyle modifications for mild hyperlipidemia    Subjective  62-year-old female who is previously diagnosed with adrenal insufficiency via cortisol saliva samples.  Had been on homeopathic adrenal supplementation previously.  Reports that she has not felt good since her complete hysterectomy 2001.  Intermittent fatigue.  Still with intermittent palpitations which she feels as a brief rapid heartbeat.  These can be triggered by chocolate and coffee.  Intermittently feel a sudden drop in her heart rate with lightheadedness while standing.  Episodes of palpitations are occurring about twice a week.  Holter monitor last fall revealed premature ventricular contractions and short runs of nonsustained atrial tachycardia which were asymptomatic.  Blood pressures running less than 130/80 mmHg.  She has been evaluated by endocrinology and a cortisol insufficiency has been diagnosed but is currently not being treated pharmacologically.  Works as a hospice nurse.  Currently not getting regular exercise.  Denies chest discomfort.    PHYSICAL EXAMINATION:  Vitals:    04/11/18 0901   Weight: 75.8 kg (167 lb)   Height: 165.1 cm (65\") "     General Appearance:    Alert, cooperative, no distress, appears stated age   Head:    Normocephalic, without obvious abnormality, atraumatic   Eyes:    conjunctiva/corneas clear   Nose:   Nares normal, septum midline, mucosa normal, no drainage   Throat:   Lips, teeth and gums normal   Neck:   Supple, symmetrical, trachea midline, no carotid    bruit or JVD   Lungs:     Clear to auscultation bilaterally, respirations unlabored   Chest Wall:    No tenderness or deformity    Heart:    Regular rate and rhythm, S1 and S2 normal, no murmur, rub   or gallop, normal carotid impulse bilaterally without bruit.   Abdomen:     Soft, non-tender   Extremities:   Extremities normal, atraumatic, no cyanosis or edema   Pulses:   2+ and symmetric all extremities   Skin:   Skin color, texture, turgor normal, no rashes or lesions       Diagnostic Data:  Procedures  Lab Results   Component Value Date    CHLPL 224 (H) 04/09/2018    TRIG 131 04/09/2018    HDL 53 04/09/2018     Lab Results   Component Value Date    BUN 16 04/09/2018    CREATININE 0.80 04/09/2018     04/09/2018    K 4.5 04/09/2018     04/09/2018    CO2 28.0 04/09/2018     No results found for: HGBA1C  Lab Results   Component Value Date    WBC 5.86 10/31/2017    HGB 13.8 10/31/2017    HCT 41.1 10/31/2017     10/31/2017       Allergies  Allergies   Allergen Reactions   • Demerol [Meperidine]    • Erythromycin    • Morphine And Related    • Penicillins    • Percocet [Oxycodone-Acetaminophen]    • Sulfa Antibiotics    • Epinephrine Anxiety       Current Medications    Current Outpatient Prescriptions:   •  cefuroxime (CEFTIN) 250 MG tablet, Take 1 tablet by mouth Daily., Disp: 20 tablet, Rfl: 0  •  Cholecalciferol (VITAMIN D3) 5000 units capsule capsule, Take 5,000 Units by mouth Daily. Twice a week, Disp: , Rfl:   •  coenzyme Q10 100 MG capsule, Take 100 mg by mouth Daily., Disp: , Rfl:   •  vitamin C (ASCORBIC ACID) 500 MG tablet, Take 500 mg by mouth  2 (Two) Times a Day., Disp: , Rfl:           ROS  Review of Systems   Cardiovascular: Positive for irregular heartbeat, orthopnea and palpitations. Negative for chest pain and dyspnea on exertion.   Respiratory: Positive for snoring. Negative for shortness of breath.      SOCIAL HX  Social History     Social History   • Marital status:      Spouse name: N/A   • Number of children: N/A   • Years of education: N/A     Occupational History   • Not on file.     Social History Main Topics   • Smoking status: Never Smoker   • Smokeless tobacco: Never Used   • Alcohol use No   • Drug use: No   • Sexual activity: No     Other Topics Concern   • Not on file     Social History Narrative   • No narrative on file       FAMILY HX  Family History   Problem Relation Age of Onset   • Heart attack Mother    • Cancer Mother      bladder   • Hypertension Mother    • Hyperlipidemia Mother    • Glaucoma Father    • Bradycardia Father    • Rheum arthritis Sister    • Mental illness Brother      schizophrenia   • Tuberculosis Son    • Stroke Paternal Grandmother    • Thyroid disease Paternal Grandmother    • Osteoporosis Sister    • Migraines Sister    • Mental illness Sister      schizophrenia   • Cancer Sister 43     breast   • Liver disease Sister      SAGE   • Obesity Sister    • Cancer Sister    • Liver disease Sister    • Mental illness Sister    • Mental illness Brother              Avelino Pizarro III, MD, [facility]

## 2018-05-04 ENCOUNTER — TELEPHONE (OUTPATIENT)
Dept: CARDIOLOGY | Facility: CLINIC | Age: 63
End: 2018-05-04

## 2018-05-04 NOTE — TELEPHONE ENCOUNTER
----- Message from Avelino Pizarro III, MD sent at 5/3/2018  4:03 PM EDT -----  Would recommend that she start lopressor 25 mg po bid      Pt does not wish to start metoprolol. She states she has baseline bradycardia and had an episode on 4/30 in which she was dizzy and lightheaded. She reports her HR was in the low 50's. She wanted you to be aware. I went over high/low rates from Holter and discussed the purpose of the low dose metoprolol. She is hesitant and would like a call back after Dr. Pizarro reviews. KH

## 2018-05-07 ENCOUNTER — OFFICE VISIT (OUTPATIENT)
Dept: CARDIOLOGY | Facility: CLINIC | Age: 63
End: 2018-05-07

## 2018-05-07 VITALS
WEIGHT: 164 LBS | HEART RATE: 54 BPM | SYSTOLIC BLOOD PRESSURE: 126 MMHG | HEIGHT: 65 IN | DIASTOLIC BLOOD PRESSURE: 64 MMHG | BODY MASS INDEX: 27.32 KG/M2

## 2018-05-07 DIAGNOSIS — R00.2 PALPITATIONS: ICD-10-CM

## 2018-05-07 DIAGNOSIS — E78.2 MIXED HYPERLIPIDEMIA: Primary | ICD-10-CM

## 2018-05-07 DIAGNOSIS — R00.1 BRADYCARDIA: ICD-10-CM

## 2018-05-07 PROCEDURE — 99213 OFFICE O/P EST LOW 20 MIN: CPT | Performed by: INTERNAL MEDICINE

## 2018-05-07 NOTE — PROGRESS NOTES
[facility] at [facility]  Office visit  Emmie Ji  1955  [phone]  VISIT DATE:  01/08/2018    PCP: Enid Alfaro MD  [address] [place] [id number]    CC:  No chief complaint on file.    Previous cardiac studies and procedures:  February 2017: Echo-normal, treadmill test-clinically and electrically positive.  January 2018: stress echocardiogram-electrically positive, clinically negative, normal echocardiographic imaging, excellent functional capacity.  May 2018: Holter- Rare PVCs which are intermittently symptomatic. Episodes of nonsustained atrial tachycardia lasting up to 16 seconds in duration ranging from 123-176 bpm, intermittently symptomatic. 9 beat run of nonsustained ventricular tachycardia at a rate of 152 bpm.    ASSESSMENT:   Diagnosis Plan   1. Mixed hyperlipidemia     2. Palpitations     3. Bradycardia         PLAN:  Symptomatic premature ventricular contractions: Currently not limiting functional capacity.  Did not want a trial of beta-blockade due to potential side effects of fatigue in symptomatic bradycardia.    Nonsustained ventricular tachycardia: Asymptomatic.  Low risk for sustained ventricular arrhythmias, normal LV and RV systolic function without significant underlying structural heart disease.  No evidence of ischemic heart disease on stress echocardiogram.    Dietary and lifestyle modifications for mild hyperlipidemia    Subjective  62-year-old female who is previously diagnosed with adrenal insufficiency via cortisol saliva samples.  Had been on homeopathic adrenal supplementation previously.  Reports that she has not felt good since her complete hysterectomy 2001.  Intermittent fatigue.  Still with intermittent palpitations which she feels as a brief rapid heartbeat.  These can be triggered by chocolate and coffee.  Intermittently feel a sudden drop in her heart rate with lightheadedness while standing.  Episodes of  palpitations are occurring about twice a week.  Holter monitor last fall revealed premature ventricular contractions and short runs of nonsustained atrial tachycardia which were asymptomatic.  Blood pressures running less than 130/80 mmHg.  She has been evaluated by endocrinology and a cortisol insufficiency has been diagnosed but is currently not being treated pharmacologically.  Works as a hospice nurse.  Currently exercising for 20-30 minutes 3 times a week.  Intermittent orthostasis.  Reviewed results of recent Holter monitor.    PHYSICAL EXAMINATION:  There were no vitals filed for this visit.  General Appearance:    Alert, cooperative, no distress, appears stated age   Head:    Normocephalic, without obvious abnormality, atraumatic   Eyes:    conjunctiva/corneas clear   Nose:   Nares normal, septum midline, mucosa normal, no drainage   Throat:   Lips, teeth and gums normal   Neck:   Supple, symmetrical, trachea midline, no carotid    bruit or JVD   Lungs:     Clear to auscultation bilaterally, respirations unlabored   Chest Wall:    No tenderness or deformity    Heart:    Regular rate and rhythm, S1 and S2 normal, no murmur, rub   or gallop, normal carotid impulse bilaterally without bruit.   Abdomen:     Soft, non-tender   Extremities:   Extremities normal, atraumatic, no cyanosis or edema   Pulses:   2+ and symmetric all extremities   Skin:   Skin color, texture, turgor normal, no rashes or lesions       Diagnostic Data:  Procedures  Lab Results   Component Value Date    CHLPL 224 (H) 04/09/2018    TRIG 131 04/09/2018    HDL 53 04/09/2018     Lab Results   Component Value Date    BUN 16 04/09/2018    CREATININE 0.80 04/09/2018     04/09/2018    K 4.5 04/09/2018     04/09/2018    CO2 28.0 04/09/2018     No results found for: HGBA1C  Lab Results   Component Value Date    WBC 5.86 10/31/2017    HGB 13.8 10/31/2017    HCT 41.1 10/31/2017     10/31/2017       Allergies  Allergies   Allergen  Reactions   • Demerol [Meperidine]    • Erythromycin    • Morphine And Related    • Penicillins    • Percocet [Oxycodone-Acetaminophen]    • Sulfa Antibiotics    • Epinephrine Anxiety       Current Medications    Current Outpatient Prescriptions:   •  cefuroxime (CEFTIN) 250 MG tablet, Take 1 tablet by mouth Daily., Disp: 20 tablet, Rfl: 0  •  Cholecalciferol (VITAMIN D3) 5000 units capsule capsule, Take 5,000 Units by mouth Daily. Twice a week, Disp: , Rfl:   •  coenzyme Q10 100 MG capsule, Take 100 mg by mouth Daily., Disp: , Rfl:   •  vitamin C (ASCORBIC ACID) 500 MG tablet, Take 500 mg by mouth 2 (Two) Times a Day., Disp: , Rfl:           ROS  Review of Systems   Cardiovascular: Positive for irregular heartbeat, leg swelling and palpitations. Negative for chest pain and dyspnea on exertion.   Respiratory: Positive for sleep disturbances due to breathing and snoring. Negative for shortness of breath.      SOCIAL HX  Social History     Social History   • Marital status:      Spouse name: N/A   • Number of children: N/A   • Years of education: N/A     Occupational History   • Not on file.     Social History Main Topics   • Smoking status: Never Smoker   • Smokeless tobacco: Never Used   • Alcohol use No   • Drug use: No   • Sexual activity: No     Other Topics Concern   • Not on file     Social History Narrative   • No narrative on file       FAMILY HX  Family History   Problem Relation Age of Onset   • Heart attack Mother    • Cancer Mother      bladder   • Hypertension Mother    • Hyperlipidemia Mother    • Glaucoma Father    • Bradycardia Father    • Rheum arthritis Sister    • Mental illness Brother      schizophrenia   • Tuberculosis Son    • Stroke Paternal Grandmother    • Thyroid disease Paternal Grandmother    • Osteoporosis Sister    • Migraines Sister    • Mental illness Sister      schizophrenia   • Cancer Sister 43     breast   • Liver disease Sister      SAGE   • Obesity Sister    • Cancer  Sister    • Liver disease Sister    • Mental illness Sister    • Mental illness Brother              Avelino Pizarro III, MD, FACC

## 2019-02-18 ENCOUNTER — TRANSCRIBE ORDERS (OUTPATIENT)
Dept: INTERNAL MEDICINE | Facility: CLINIC | Age: 64
End: 2019-02-18

## 2019-02-18 DIAGNOSIS — Z12.39 SCREENING BREAST EXAMINATION: Primary | ICD-10-CM

## 2019-03-13 ENCOUNTER — OFFICE (OUTPATIENT)
Dept: URBAN - METROPOLITAN AREA CLINIC 4 | Facility: CLINIC | Age: 64
End: 2019-03-13

## 2019-03-13 VITALS — SYSTOLIC BLOOD PRESSURE: 126 MMHG | HEIGHT: 65 IN | WEIGHT: 166 LBS | DIASTOLIC BLOOD PRESSURE: 68 MMHG

## 2019-03-13 DIAGNOSIS — K62.89 OTHER SPECIFIED DISEASES OF ANUS AND RECTUM: ICD-10-CM

## 2019-03-13 DIAGNOSIS — K62.5 HEMORRHAGE OF ANUS AND RECTUM: ICD-10-CM

## 2019-03-13 PROCEDURE — 99204 OFFICE O/P NEW MOD 45 MIN: CPT | Performed by: NURSE PRACTITIONER

## 2019-04-08 ENCOUNTER — OFFICE VISIT (OUTPATIENT)
Dept: CARDIOLOGY | Facility: CLINIC | Age: 64
End: 2019-04-08

## 2019-04-08 ENCOUNTER — APPOINTMENT (OUTPATIENT)
Dept: MAMMOGRAPHY | Facility: HOSPITAL | Age: 64
End: 2019-04-08

## 2019-04-08 VITALS
HEART RATE: 59 BPM | WEIGHT: 164 LBS | DIASTOLIC BLOOD PRESSURE: 66 MMHG | SYSTOLIC BLOOD PRESSURE: 144 MMHG | BODY MASS INDEX: 27.32 KG/M2 | HEIGHT: 65 IN

## 2019-04-08 DIAGNOSIS — R00.2 PALPITATIONS: Primary | ICD-10-CM

## 2019-04-08 DIAGNOSIS — I49.3 PVC (PREMATURE VENTRICULAR CONTRACTION): ICD-10-CM

## 2019-04-08 PROCEDURE — 99213 OFFICE O/P EST LOW 20 MIN: CPT | Performed by: INTERNAL MEDICINE

## 2019-04-08 RX ORDER — MULTIPLE VITAMINS W/ MINERALS TAB 9MG-400MCG
1 TAB ORAL DAILY
COMMUNITY

## 2019-04-08 NOTE — PROGRESS NOTES
[facility] at [facility]  Office visit  Emmie Ji  1955  [phone]  VISIT DATE:  01/08/2018    PCP: Enid Alfaro MD  [address] [place] [id number]    CC:  Chief Complaint   Patient presents with   • Mixed hyperlipidemia     Previous cardiac studies and procedures:  February 2017: Echo-normal, treadmill test-clinically and electrically positive.  January 2018: stress echocardiogram-electrically positive, clinically negative, normal echocardiographic imaging, excellent functional capacity.  May 2018: Holter- Rare PVCs which are intermittently symptomatic. Episodes of nonsustained atrial tachycardia lasting up to 16 seconds in duration ranging from 123-176 bpm, intermittently symptomatic. 9 beat run of nonsustained ventricular tachycardia at a rate of 152 bpm.    ASSESSMENT:   Diagnosis Plan   1. Palpitations     2. PVC (premature ventricular contraction)         PLAN:  Symptomatic premature ventricular contractions: Currently not limiting functional capacity.  Trial of either calcium channel blockade or beta-blockade if it increases in frequency and begins to affect quality of life.    Nonsustained ventricular tachycardia: Asymptomatic.  Low risk for sustained ventricular arrhythmias, normal LV and RV systolic function without significant underlying structural heart disease.  No evidence of ischemic heart disease on stress echocardiogram.  Will consider repeating ambulatory ECG monitor at follow-up.    Dietary and lifestyle modifications for mild hyperlipidemia    Subjective  63-year-old female who is previously diagnosed with adrenal insufficiency via cortisol saliva samples.  Had been on homeopathic adrenal supplementation previously.  Reports that she has not felt good since her complete hysterectomy 2001.  Continues to maintain an active lifestyle.  Blood pressures running less than 130/80 mmHg.  Only rare intermittent palpitations which he usually  "senses at rest.  Sleep deprivation and work-related stress appear to be the main triggers.    PHYSICAL EXAMINATION:  Vitals:    04/08/19 1504   BP: 144/66   BP Location: Right arm   Patient Position: Sitting   Pulse: 59   Weight: 74.4 kg (164 lb)   Height: 165.1 cm (65\")     General Appearance:    Alert, cooperative, no distress, appears stated age   Head:    Normocephalic, without obvious abnormality, atraumatic   Eyes:    conjunctiva/corneas clear   Nose:   Nares normal, septum midline, mucosa normal, no drainage   Throat:   Lips, teeth and gums normal   Neck:   Supple, symmetrical, trachea midline, no carotid    bruit or JVD   Lungs:     Clear to auscultation bilaterally, respirations unlabored   Chest Wall:    No tenderness or deformity    Heart:    Regular rate and rhythm, S1 and S2 normal, no murmur, rub   or gallop, normal carotid impulse bilaterally without bruit.   Abdomen:     Soft, non-tender   Extremities:   Extremities normal, atraumatic, no cyanosis or edema   Pulses:   2+ and symmetric all extremities   Skin:   Skin color, texture, turgor normal, no rashes or lesions       Diagnostic Data:  Procedures  Lab Results   Component Value Date    CHLPL 224 (H) 04/09/2018    TRIG 131 04/09/2018    HDL 53 04/09/2018     Lab Results   Component Value Date    BUN 16 04/09/2018    CREATININE 0.80 04/09/2018     04/09/2018    K 4.5 04/09/2018     04/09/2018    CO2 28.0 04/09/2018     No results found for: HGBA1C  Lab Results   Component Value Date    WBC 5.86 10/31/2017    HGB 13.8 10/31/2017    HCT 41.1 10/31/2017     10/31/2017       Allergies  Allergies   Allergen Reactions   • Demerol [Meperidine]    • Erythromycin    • Morphine And Related    • Penicillins    • Percocet [Oxycodone-Acetaminophen]    • Sulfa Antibiotics    • Epinephrine Anxiety       Current Medications    Current Outpatient Medications:   •  Calcium 150 MG tablet, Take 150 mg by mouth Daily., Disp: , Rfl:   •  " Cholecalciferol (VITAMIN D3) 5000 units capsule capsule, Take 5,000 Units by mouth Daily. Twice a week, Disp: , Rfl:   •  coenzyme Q10 100 MG capsule, Take 100 mg by mouth Daily., Disp: , Rfl:   •  Multiple Vitamins-Minerals (MULTIVITAMIN WITH MINERALS) tablet tablet, Take 1 tablet by mouth Daily., Disp: , Rfl:   •  Nutritional Supplements (CATALYTIC FORMULA PO), Take 1 capsule by mouth Daily., Disp: , Rfl:   •  vitamin C (ASCORBIC ACID) 500 MG tablet, Take 500 mg by mouth 2 (Two) Times a Day., Disp: , Rfl:           ROS  Review of Systems   Cardiovascular: Positive for irregular heartbeat and palpitations. Negative for chest pain, dyspnea on exertion and leg swelling.   Respiratory: Positive for sleep disturbances due to breathing and snoring. Negative for shortness of breath.      SOCIAL HX  Social History     Socioeconomic History   • Marital status:      Spouse name: Not on file   • Number of children: Not on file   • Years of education: Not on file   • Highest education level: Not on file   Tobacco Use   • Smoking status: Never Smoker   • Smokeless tobacco: Never Used   Substance and Sexual Activity   • Alcohol use: No   • Drug use: No   • Sexual activity: No       FAMILY HX  Family History   Problem Relation Age of Onset   • Heart attack Mother    • Cancer Mother         bladder   • Hypertension Mother    • Hyperlipidemia Mother    • Glaucoma Father    • Bradycardia Father    • Rheum arthritis Sister    • Mental illness Brother         schizophrenia   • Tuberculosis Son    • Stroke Paternal Grandmother    • Thyroid disease Paternal Grandmother    • Osteoporosis Sister    • Migraines Sister    • Mental illness Sister         schizophrenia   • Cancer Sister 43        breast   • Liver disease Sister         SAGE   • Obesity Sister    • Cancer Sister    • Liver disease Sister    • Mental illness Sister    • Mental illness Brother              Avelino Pizarro III, MD, [facility]

## 2019-04-09 ENCOUNTER — TELEPHONE (OUTPATIENT)
Dept: CARDIOLOGY | Facility: CLINIC | Age: 64
End: 2019-04-09

## 2019-04-09 NOTE — TELEPHONE ENCOUNTER
Requesting cardiac clearance for a colonoscopy scheduled with Dr.Earl Palacios on 4/17/19. Also, wants to know if its safe for her to have propofol for her anesthesia.Please advise.

## 2019-09-21 ENCOUNTER — OFFICE VISIT (OUTPATIENT)
Dept: INTERNAL MEDICINE | Facility: CLINIC | Age: 64
End: 2019-09-21

## 2019-09-21 VITALS
DIASTOLIC BLOOD PRESSURE: 60 MMHG | WEIGHT: 163 LBS | RESPIRATION RATE: 16 BRPM | OXYGEN SATURATION: 96 % | SYSTOLIC BLOOD PRESSURE: 104 MMHG | HEIGHT: 65 IN | HEART RATE: 62 BPM | BODY MASS INDEX: 27.16 KG/M2 | TEMPERATURE: 97.8 F

## 2019-09-21 DIAGNOSIS — E04.9 ENLARGED THYROID: ICD-10-CM

## 2019-09-21 DIAGNOSIS — Z28.21 INFLUENZA VACCINATION DECLINED: ICD-10-CM

## 2019-09-21 DIAGNOSIS — Z87.19 HISTORY OF ANAL FISSURES: ICD-10-CM

## 2019-09-21 DIAGNOSIS — E27.40 ADRENAL INSUFFICIENCY (HCC): ICD-10-CM

## 2019-09-21 DIAGNOSIS — E78.2 MIXED HYPERLIPIDEMIA: ICD-10-CM

## 2019-09-21 DIAGNOSIS — K62.5 RECTAL BLEEDING: ICD-10-CM

## 2019-09-21 DIAGNOSIS — Z00.00 ANNUAL PHYSICAL EXAM: Primary | ICD-10-CM

## 2019-09-21 DIAGNOSIS — R00.1 BRADYCARDIA: ICD-10-CM

## 2019-09-21 DIAGNOSIS — R53.83 FATIGUE, UNSPECIFIED TYPE: ICD-10-CM

## 2019-09-21 DIAGNOSIS — M85.80 OSTEOPENIA DETERMINED BY X-RAY: ICD-10-CM

## 2019-09-21 PROBLEM — R00.2 PALPITATIONS: Status: RESOLVED | Noted: 2017-12-05 | Resolved: 2019-09-21

## 2019-09-21 PROBLEM — R07.2 PRECORDIAL PAIN: Status: RESOLVED | Noted: 2017-12-05 | Resolved: 2019-09-21

## 2019-09-21 PROCEDURE — 99396 PREV VISIT EST AGE 40-64: CPT | Performed by: FAMILY MEDICINE

## 2019-09-21 NOTE — PROGRESS NOTES
"09/21/2019  Chief Complaint   Patient presents with   • Annual Exam     Patient here for physical, patient states she is having some arthritis pain in both feet and right elbow.         Emmie Ji is here for her annual preventive exam.    Was followed by endocrinology for adrenal gland issues but no actions needed at this time so she'll return there if needed.    Followed by cardiology for bradycardia, last note reviewed.    Hyperlipidemia: on co-q10. Not keen on statin use. Has tried red yeast rice. Eats a lot of oatmeal.     Had a rectal bleed earlier this year. She did not get a colonoscopy due to her fear of perforation. History of gastric ulcers. She felt this was likely an ulcer or a tear. Bleeding has stopped. She used nitro lidocaine and it helped. She's never had colonoscopy. Negative Cologuard 10/2017.        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]   • Chronic non-seasonal allergic rhinitis [J30.89]   • Hyperlipidemia [E78.5]       Health Maintenance   Topic Date Due   • LIPID PANEL  09/24/2019 (Originally 4/9/2019)   • ZOSTER VACCINE (1 of 2) 09/29/2020 (Originally 9/2/2005)   • MAMMOGRAM  12/09/2019   • ANNUAL PHYSICAL  09/22/2020   • COLOGUARD  10/31/2020   • TDAP/TD VACCINES (2 - Td) 07/26/2022   • HEPATITIS C SCREENING  Completed   • INFLUENZA VACCINE  Addressed   • PAP SMEAR  Discontinued   • DXA SCAN  Discontinued       Immunization History   Administered Date(s) Administered   • Flu Vaccine Quad PF >18YRS 11/01/2017   • Td 06/22/1993   • Tdap 07/26/2012         Vitals:    09/21/19 0931   BP: 104/60   Pulse: 62   Resp: 16   Temp: 97.8 °F (36.6 °C)   SpO2: 96%   Weight: 73.9 kg (163 lb)   Height: 165.1 cm (65\")     Patient's Body " mass index is 27.12 kg/m². BMI is above normal parameters. Recommendations include: exercise counseling and nutrition counseling.      Review of Systems   Constitutional: Positive for fatigue. Negative for activity change.   Cardiovascular:        Slow heart rate   Gastrointestinal: Positive for anal bleeding. Negative for abdominal pain.   Musculoskeletal: Positive for arthralgias.   All other systems reviewed and are negative.      Physical Exam   Constitutional: She is oriented to person, place, and time. Vital signs are normal. She appears well-developed and well-nourished. She is active.  Non-toxic appearance. She does not have a sickly appearance. She does not appear ill. No distress.   HENT:   Head: Normocephalic and atraumatic. Hair is normal.   Right Ear: Hearing, tympanic membrane, external ear and ear canal normal.   Left Ear: Hearing, tympanic membrane, external ear and ear canal normal.   Nose: Nose normal.   Mouth/Throat: Uvula is midline, oropharynx is clear and moist and mucous membranes are normal. Mucous membranes are not dry. No trismus in the jaw. Normal dentition. No oropharyngeal exudate.   Eyes: Conjunctivae, EOM and lids are normal. Pupils are equal, round, and reactive to light. Right eye exhibits no discharge. Left eye exhibits no discharge. No scleral icterus.   Neck: Trachea normal and phonation normal. Neck supple. No tracheal deviation present. Thyromegaly present. No thyroid mass present.   Cardiovascular: Regular rhythm and normal heart sounds. Bradycardia present. Exam reveals no gallop and no friction rub.   No murmur heard.  Pulmonary/Chest: Effort normal and breath sounds normal. She exhibits no tenderness.   Abdominal: Soft. Bowel sounds are normal. She exhibits no distension and no mass. There is no hepatosplenomegaly. There is no tenderness. There is no rigidity, no rebound and no guarding.   Musculoskeletal: She exhibits no edema, tenderness or deformity.   Lymphadenopathy:         Head (right side): No submandibular adenopathy present.        Head (left side): No submandibular adenopathy present.     She has no cervical adenopathy.   Neurological: She is alert and oriented to person, place, and time. She has normal strength. She displays no atrophy and no tremor. No cranial nerve deficit. She exhibits normal muscle tone. She displays no seizure activity. Gait normal.   Skin: Skin is warm and intact. Capillary refill takes less than 2 seconds. Turgor is normal. No rash noted. She is not diaphoretic. No cyanosis. No pallor. Nails show no clubbing.   Psychiatric: She has a normal mood and affect. Her speech is normal and behavior is normal. Judgment and thought content normal. Cognition and memory are normal.   Nursing note and vitals reviewed.        Problem List Items Addressed This Visit        Cardiovascular and Mediastinum    Hyperlipidemia    Overview     last lipids 257/63/163/155         Relevant Orders    Comprehensive Metabolic Panel    Lipid Panel    Bradycardia    Relevant Orders    TSH+Free T4       Endocrine    Adrenal insufficiency (CMS/HCC)    Relevant Orders    Comprehensive Metabolic Panel    Cortisol - AM    ACTH       Other    Osteopenia determined by x-ray    Overview     Discussed treatments, patient prefers conservative approach with calcium in diet, vitamin D supplementation in winter, and weight-bearing exercise. Last DEXA 2015.         Relevant Orders    Vitamin D 25 Hydroxy      Other Visit Diagnoses     Annual physical exam    -  Primary    Relevant Orders    CBC & Differential    Comprehensive Metabolic Panel    Lipid Panel    TSH+Free T4    Vitamin D 25 Hydroxy    Vitamin B12    Folate    Iron Profile    Ferritin    Cortisol - AM    ACTH    Rectal bleeding        Relevant Orders    Vitamin B12    Folate    Iron Profile    Ferritin    History of anal fissures        Enlarged thyroid        Relevant Orders    TSH+Free T4    US Thyroid    Influenza vaccination  declined        Fatigue, unspecified type        Relevant Orders    CBC & Differential    Comprehensive Metabolic Panel    TSH+Free T4    Vitamin D 25 Hydroxy    Vitamin B12    Folate    Iron Profile    Ferritin    Cortisol - AM    ACTH          · Health maintenance information provided with patient plan.   · Counseled on age appropriate health screenings.  · Encouraged colonoscopy if bowel issues were to recur.  · Turmeric for joint inflammation likely due to osteoarthritis   · Stay as active as possible for osteopenia.    Return in about 368 days (around 9/23/2020).    Enid Alfaro MD

## 2019-09-21 NOTE — PATIENT INSTRUCTIONS
Health Maintenance for Postmenopausal Women  Menopause is a normal process in which your reproductive ability comes to an end. This process happens gradually over a span of months to years, usually between the ages of 48 and 55. Menopause is complete when you have missed 12 consecutive menstrual periods.  It is important to talk with your health care provider about some of the most common conditions that affect postmenopausal women, such as heart disease, cancer, and bone loss (osteoporosis). Adopting a healthy lifestyle and getting preventive care can help to promote your health and wellness. Those actions can also lower your chances of developing some of these common conditions.  What should I know about menopause?  During menopause, you may experience a number of symptoms, such as:  · Moderate-to-severe hot flashes.  · Night sweats.  · Decrease in sex drive.  · Mood swings.  · Headaches.  · Tiredness.  · Irritability.  · Memory problems.  · Insomnia.     Choosing to treat or not to treat menopausal changes is an individual decision that you make with your health care provider.  What should I know about hormone replacement therapy and supplements?  Hormone therapy products are effective for treating symptoms that are associated with menopause, such as hot flashes and night sweats. Hormone replacement carries certain risks, especially as you become older. If you are thinking about using estrogen or estrogen with progestin treatments, discuss the benefits and risks with your health care provider.  What should I know about heart disease and stroke?  Heart disease, heart attack, and stroke become more likely as you age. This may be due, in part, to the hormonal changes that your body experiences during menopause. These can affect how your body processes dietary fats, triglycerides, and cholesterol. Heart attack and stroke are both medical emergencies.    There are many things that you can do to help prevent heart  disease and stroke:  · Have your blood pressure checked at least every 1-2 years. High blood pressure causes heart disease and increases the risk of stroke.  · If you are 55-79 years old, ask your health care provider if you should take aspirin to prevent a heart attack or a stroke.  · Do not use any tobacco products, including cigarettes, chewing tobacco, or electronic cigarettes. If you need help quitting, ask your health care provider.  · It is important to eat a healthy diet and maintain a healthy weight.  ? Be sure to include plenty of vegetables, fruits, low-fat dairy products, and lean protein.  ? Avoid eating foods that are high in solid fats, added sugars, or salt (sodium).  · Get regular exercise. This is one of the most important things that you can do for your health.  ? Try to exercise for at least 150 minutes each week. The type of exercise that you do should increase your heart rate and make you sweat. This is known as moderate-intensity exercise.  ? Try to do strengthening exercises at least twice each week. Do these in addition to the moderate-intensity exercise.  · Know your numbers. Ask your health care provider to check your cholesterol and your blood glucose. Continue to have your blood tested as directed by your health care provider.     What should I know about cancer screening?  There are several types of cancer. Take the following steps to reduce your risk and to catch any cancer development as early as possible.  Breast Cancer  · Practice breast self-awareness.  ? This means understanding how your breasts normally appear and feel.  ? It also means doing regular breast self-exams. Let your health care provider know about any changes, no matter how small.  · If you are 40 or older, have a clinician do a breast exam (clinical breast exam or CBE) every year. Depending on your age, family history, and medical history, it may be recommended that you also have a yearly breast X-ray  (mammogram).  · If you have a family history of breast cancer, talk with your health care provider about genetic screening.  · If you are at high risk for breast cancer, talk with your health care provider about having an MRI and a mammogram every year.  · Breast cancer (BRCA) gene test is recommended for women who have family members with BRCA-related cancers. Results of the assessment will determine the need for genetic counseling and BRCA1 and for BRCA2 testing. BRCA-related cancers include these types:  ? Breast. This occurs in males or females.  ? Ovarian.  ? Tubal. This may also be called fallopian tube cancer.  ? Cancer of the abdominal or pelvic lining (peritoneal cancer).  ? Prostate.  ? Pancreatic.     Cervical, Uterine, and Ovarian Cancer  Your health care provider may recommend that you be screened regularly for cancer of the pelvic organs. These include your ovaries, uterus, and vagina. This screening involves a pelvic exam, which includes checking for microscopic changes to the surface of your cervix (Pap test).  · For women ages 21-65, health care providers may recommend a pelvic exam and a Pap test every three years. For women ages 30-65, they may recommend the Pap test and pelvic exam, combined with testing for human papilloma virus (HPV), every five years. Some types of HPV increase your risk of cervical cancer. Testing for HPV may also be done on women of any age who have unclear Pap test results.  · Other health care providers may not recommend any screening for nonpregnant women who are considered low risk for pelvic cancer and have no symptoms. Ask your health care provider if a screening pelvic exam is right for you.  · If you have had past treatment for cervical cancer or a condition that could lead to cancer, you need Pap tests and screening for cancer for at least 20 years after your treatment. If Pap tests have been discontinued for you, your risk factors (such as having a new sexual  partner) need to be reassessed to determine if you should start having screenings again. Some women have medical problems that increase the chance of getting cervical cancer. In these cases, your health care provider may recommend that you have screening and Pap tests more often.  · If you have a family history of uterine cancer or ovarian cancer, talk with your health care provider about genetic screening.  · If you have vaginal bleeding after reaching menopause, tell your health care provider.  · There are currently no reliable tests available to screen for ovarian cancer.     Lung Cancer  Lung cancer screening is recommended for adults 55-80 years old who are at high risk for lung cancer because of a history of smoking. A yearly low-dose CT scan of the lungs is recommended if you:  · Currently smoke.  · Have a history of at least 30 pack-years of smoking and you currently smoke or have quit within the past 15 years. A pack-year is smoking an average of one pack of cigarettes per day for one year.     Yearly screening should:  · Continue until it has been 15 years since you quit.  · Stop if you develop a health problem that would prevent you from having lung cancer treatment.     Colorectal Cancer  · This type of cancer can be detected and can often be prevented.  · Routine colorectal cancer screening usually begins at age 50 and continues through age 75.  · If you have risk factors for colon cancer, your health care provider may recommend that you be screened at an earlier age.  · If you have a family history of colorectal cancer, talk with your health care provider about genetic screening.  · Your health care provider may also recommend using home test kits to check for hidden blood in your stool.  · A small camera at the end of a tube can be used to examine your colon directly (sigmoidoscopy or colonoscopy). This is done to check for the earliest forms of colorectal cancer.  · Direct examination of the colon  should be repeated every 5-10 years until age 75. However, if early forms of precancerous polyps or small growths are found or if you have a family history or genetic risk for colorectal cancer, you may need to be screened more often.     Skin Cancer  · Check your skin from head to toe regularly.  · Monitor any moles. Be sure to tell your health care provider:  ? About any new moles or changes in moles, especially if there is a change in a mole's shape or color.  ? If you have a mole that is larger than the size of a pencil eraser.  · If any of your family members has a history of skin cancer, especially at a young age, talk with your health care provider about genetic screening.  · Always use sunscreen. Apply sunscreen liberally and repeatedly throughout the day.  · Whenever you are outside, protect yourself by wearing long sleeves, pants, a wide-brimmed hat, and sunglasses.     What should I know about osteoporosis?  Osteoporosis is a condition in which bone destruction happens more quickly than new bone creation. After menopause, you may be at an increased risk for osteoporosis. To help prevent osteoporosis or the bone fractures that can happen because of osteoporosis, the following is recommended:  · If you are 19-50 years old, get at least 1,000 mg of calcium and at least 600 mg of vitamin D per day.  · If you are older than age 50 but younger than age 70, get at least 1,200 mg of calcium and at least 600 mg of vitamin D per day.  · If you are older than age 70, get at least 1,200 mg of calcium and at least 800 mg of vitamin D per day.     Smoking and excessive alcohol intake increase the risk of osteoporosis. Eat foods that are rich in calcium and vitamin D, and do weight-bearing exercises several times each week as directed by your health care provider.  What should I know about how menopause affects my mental health?  Depression may occur at any age, but it is more common as you become older. Common symptoms  of depression include:  · Low or sad mood.  · Changes in sleep patterns.  · Changes in appetite or eating patterns.  · Feeling an overall lack of motivation or enjoyment of activities that you previously enjoyed.  · Frequent crying spells.     Talk with your health care provider if you think that you are experiencing depression.  What should I know about immunizations?  It is important that you get and maintain your immunizations. These include:  · Tetanus, diphtheria, and pertussis (Tdap) booster vaccine.  · Influenza every year before the flu season begins.  · Pneumonia vaccine.  · Shingles vaccine.     Your health care provider may also recommend other immunizations.  This information is not intended to replace advice given to you by your health care provider. Make sure you discuss any questions you have with your health care provider.  Document Released: 02/09/2007 Document Revised: 07/07/2017 Document Reviewed: 09/20/2016  [employer] Interactive Patient Education © 2018 [employer]

## 2019-09-24 LAB
25(OH)D3+25(OH)D2 SERPL-MCNC: 56.7 NG/ML (ref 30–100)
ACTH PLAS-MCNC: 30.9 PG/ML (ref 7.2–63.3)
ALBUMIN SERPL-MCNC: 4.3 G/DL (ref 3.5–5.2)
ALBUMIN/GLOB SERPL: 2.4 G/DL
ALP SERPL-CCNC: 68 U/L (ref 39–117)
ALT SERPL-CCNC: 27 U/L (ref 1–33)
AST SERPL-CCNC: 23 U/L (ref 1–32)
BASOPHILS # BLD AUTO: 0.04 10*3/MM3 (ref 0–0.2)
BASOPHILS NFR BLD AUTO: 0.9 % (ref 0–1.5)
BILIRUB SERPL-MCNC: 0.4 MG/DL (ref 0.2–1.2)
BUN SERPL-MCNC: 13 MG/DL (ref 8–23)
BUN/CREAT SERPL: 16.7 (ref 7–25)
CALCIUM SERPL-MCNC: 9.4 MG/DL (ref 8.6–10.5)
CHLORIDE SERPL-SCNC: 104 MMOL/L (ref 98–107)
CHOLEST SERPL-MCNC: 223 MG/DL (ref 0–200)
CO2 SERPL-SCNC: 26.2 MMOL/L (ref 22–29)
CORTIS AM PEAK SERPL-MCNC: 14.4 UG/DL (ref 6.2–19.4)
CREAT SERPL-MCNC: 0.78 MG/DL (ref 0.57–1)
EOSINOPHIL # BLD AUTO: 0.12 10*3/MM3 (ref 0–0.4)
EOSINOPHIL NFR BLD AUTO: 2.7 % (ref 0.3–6.2)
ERYTHROCYTE [DISTWIDTH] IN BLOOD BY AUTOMATED COUNT: 12.7 % (ref 12.3–15.4)
FERRITIN SERPL-MCNC: 75 NG/ML (ref 13–150)
FOLATE SERPL-MCNC: >20 NG/ML (ref 4.78–24.2)
GLOBULIN SER CALC-MCNC: 1.8 GM/DL
GLUCOSE SERPL-MCNC: 97 MG/DL (ref 65–99)
HCT VFR BLD AUTO: 41.1 % (ref 34–46.6)
HDLC SERPL-MCNC: 69 MG/DL (ref 40–60)
HGB BLD-MCNC: 13.7 G/DL (ref 12–15.9)
IMM GRANULOCYTES # BLD AUTO: 0 10*3/MM3 (ref 0–0.05)
IMM GRANULOCYTES NFR BLD AUTO: 0 % (ref 0–0.5)
IRON SATN MFR SERPL: 22 % (ref 20–50)
IRON SERPL-MCNC: 88 MCG/DL (ref 37–145)
LDLC SERPL CALC-MCNC: 136 MG/DL (ref 0–100)
LYMPHOCYTES # BLD AUTO: 1.42 10*3/MM3 (ref 0.7–3.1)
LYMPHOCYTES NFR BLD AUTO: 32.4 % (ref 19.6–45.3)
MCH RBC QN AUTO: 30.4 PG (ref 26.6–33)
MCHC RBC AUTO-ENTMCNC: 33.3 G/DL (ref 31.5–35.7)
MCV RBC AUTO: 91.3 FL (ref 79–97)
MONOCYTES # BLD AUTO: 0.39 10*3/MM3 (ref 0.1–0.9)
MONOCYTES NFR BLD AUTO: 8.9 % (ref 5–12)
NEUTROPHILS # BLD AUTO: 2.41 10*3/MM3 (ref 1.7–7)
NEUTROPHILS NFR BLD AUTO: 55.1 % (ref 42.7–76)
NRBC BLD AUTO-RTO: 0 /100 WBC (ref 0–0.2)
PLATELET # BLD AUTO: 210 10*3/MM3 (ref 140–450)
POTASSIUM SERPL-SCNC: 4.3 MMOL/L (ref 3.5–5.2)
PROT SERPL-MCNC: 6.1 G/DL (ref 6–8.5)
RBC # BLD AUTO: 4.5 10*6/MM3 (ref 3.77–5.28)
SODIUM SERPL-SCNC: 142 MMOL/L (ref 136–145)
T4 FREE SERPL-MCNC: 1.04 NG/DL (ref 0.93–1.7)
TIBC SERPL-MCNC: 408 MCG/DL
TRIGL SERPL-MCNC: 89 MG/DL (ref 0–150)
TSH SERPL DL<=0.005 MIU/L-ACNC: 2.07 UIU/ML (ref 0.27–4.2)
UIBC SERPL-MCNC: 320 MCG/DL (ref 112–346)
VIT B12 SERPL-MCNC: 753 PG/ML (ref 211–946)
VLDLC SERPL CALC-MCNC: 17.8 MG/DL
WBC # BLD AUTO: 4.38 10*3/MM3 (ref 3.4–10.8)

## 2020-02-15 ENCOUNTER — TELEPHONE (OUTPATIENT)
Dept: INTERNAL MEDICINE | Facility: CLINIC | Age: 65
End: 2020-02-15

## 2020-02-15 RX ORDER — FLUCONAZOLE 150 MG/1
TABLET ORAL
Qty: 2 TABLET | Refills: 0 | Status: SHIPPED | OUTPATIENT
Start: 2020-02-15 | End: 2022-08-25

## 2020-02-15 NOTE — TELEPHONE ENCOUNTER
Pt called and would like to have a Diflucan. She said that she had been taking over the counter meds and it is not working.

## 2020-02-18 ENCOUNTER — OFFICE VISIT (OUTPATIENT)
Dept: INTERNAL MEDICINE | Facility: CLINIC | Age: 65
End: 2020-02-18

## 2020-02-18 VITALS
WEIGHT: 163 LBS | HEART RATE: 53 BPM | OXYGEN SATURATION: 98 % | RESPIRATION RATE: 16 BRPM | BODY MASS INDEX: 27.16 KG/M2 | HEIGHT: 65 IN | SYSTOLIC BLOOD PRESSURE: 131 MMHG | DIASTOLIC BLOOD PRESSURE: 59 MMHG | TEMPERATURE: 98.1 F

## 2020-02-18 DIAGNOSIS — L03.012 PARONYCHIA OF FINGER OF LEFT HAND: Primary | ICD-10-CM

## 2020-02-18 PROCEDURE — 99213 OFFICE O/P EST LOW 20 MIN: CPT | Performed by: INTERNAL MEDICINE

## 2020-02-18 RX ORDER — CEFDINIR 300 MG/1
300 CAPSULE ORAL 2 TIMES DAILY
Qty: 14 CAPSULE | Refills: 0 | Status: SHIPPED | OUTPATIENT
Start: 2020-02-18 | End: 2020-05-08

## 2020-02-18 NOTE — PATIENT INSTRUCTIONS
Paronychia  Paronychia is an infection of the skin that surrounds a nail. It usually affects the skin around a fingernail, but it may also occur near a toenail. It often causes pain and swelling around the nail. In some cases, a collection of pus (abscess) can form near or under the nail.   This condition may develop suddenly, or it may develop gradually over a longer period. In most cases, paronychia is not serious, and it will clear up with treatment.  What are the causes?  This condition may be caused by bacteria or a fungus. These germs can enter the body through an opening in the skin, such as a cut or a hangnail.  What increases the risk?  This condition is more likely to develop in people who:  · Get their hands wet often, such as those who work as dishwashers, , or nurses.  · Bite their fingernails or suck their thumbs.  · Trim their nails very short.  · Have hangnails or injured fingertips.  · Get manicures.  · Have diabetes.  What are the signs or symptoms?  Symptoms of this condition include:  · Redness and swelling of the skin near the nail.  · Tenderness around the nail when you touch the area.  · Pus-filled bumps under the skin at the base and sides of the nail (cuticle).  · Fluid or pus under the nail.  · Throbbing pain in the area.  How is this diagnosed?  This condition is diagnosed with a physical exam. In some cases, a sample of pus may be tested to determine what type of bacteria or fungus is causing the condition.  How is this treated?  Treatment depends on the cause and severity of your condition. If your condition is mild, it may clear up on its own in a few days or after soaking in warm water. If needed, treatment may include:  · Antibiotic medicine, if your infection is caused by bacteria.  · Antifungal medicine, if your infection is caused by a fungus.  · A procedure to drain pus from an abscess.  · Anti-inflammatory medicine (corticosteroids).  Follow these instructions at  home:  Wound care  · Keep the affected area clean.  · Soak the affected area in warm water, if told to do so by your health care provider. You may be told to do this for 20 minutes, 2-3 times a day.  · Keep the area dry when you are not soaking it.  · Do not try to drain an abscess yourself.  · Follow instructions from your health care provider about how to take care of the affected area. Make sure you:  ? Wash your hands with soap and water before you change your bandage (dressing). If soap and water are not available, use hand .  ? Change your dressing as told by your health care provider.  · If you had an abscess drained, check the area every day for signs of infection. Check for:  ? Redness, swelling, or pain.  ? Fluid or blood.  ? Warmth.  ? Pus or a bad smell.  Medicines    · Take over-the-counter and prescription medicines only as told by your health care provider.  · If you were prescribed an antibiotic medicine, take it as told by your health care provider. Do not stop taking the antibiotic even if you start to feel better.  General instructions  · Avoid contact with harsh chemicals.  · Do not pick at the affected area.  Prevention  · To prevent this condition from happening again:  ? Wear rubber gloves when washing dishes or doing other tasks that require your hands to get wet.  ? Wear gloves if your hands might come in contact with  or other chemicals.  ? Avoid injuring your nails or fingertips.  ? Do not bite your nails or tear hangnails.  ? Do not cut your nails very short.   ? Do not cut your cuticles.  ? Use clean nail clippers or scissors when trimming nails.  Contact a health care provider if:  · Your symptoms get worse or do not improve with treatment.  · You have continued or increased fluid, blood, or pus coming from the affected area.  · Your finger or knuckle becomes swollen or difficult to move.  Get help right away if you have:  · A fever or chills.  · Redness spreading away  from the affected area.  · Joint or muscle pain.  Summary  · Paronychia is an infection of the skin that surrounds a nail. It often causes pain and swelling around the nail. In some cases, a collection of pus (abscess) can form near or under the nail.  · This condition may be caused by bacteria or a fungus. These germs can enter the body through an opening in the skin, such as a cut or a hangnail.  · If your condition is mild, it may clear up on its own in a few days. If needed, treatment may include medicine or a procedure to drain pus from an abscess.  · To prevent this condition from happening again, wear gloves if doing tasks that require your hands to get wet or to come in contact with chemicals. Also avoid injuring your nails or fingertips.  This information is not intended to replace advice given to you by your health care provider. Make sure you discuss any questions you have with your health care provider.  Document Released: 06/13/2002 Document Revised: 12/31/2018 Document Reviewed: 12/31/2018  [employer] Interactive Patient Education © 2019 [employer]

## 2020-02-18 NOTE — PROGRESS NOTES
Chief Complaint   Patient presents with   • Finger Injury     cut over a week ago and is now infected- was given diflucan but has not taken       Subjective     History of Present Illness   Emmie Ji is a 64 y.o. female presenting for follow up with concerns of left index finger swelling at the nail with redness and tenderness x 1 week.  Has tried epsom salts, triple antibiotics cause increased pain, works as hospice nurse.     The following portions of the patient's history were reviewed and updated as appropriate: allergies, current medications, past family history, past medical history, past social history, past surgical history and problem list.    Review of Systems   Constitutional: Negative for chills, fatigue and fever.   HENT: Negative for congestion, ear pain, rhinorrhea, sinus pressure and sore throat.    Eyes: Negative for visual disturbance.   Respiratory: Negative for cough, chest tightness, shortness of breath and wheezing.    Cardiovascular: Negative for chest pain, palpitations and leg swelling.   Gastrointestinal: Negative for abdominal pain, blood in stool, constipation, diarrhea, nausea and vomiting.   Endocrine: Negative for polydipsia and polyuria.   Genitourinary: Negative for dysuria and hematuria.   Musculoskeletal: Negative for arthralgias and back pain.   Skin: Positive for wound. Negative for rash.   Neurological: Negative for dizziness, light-headedness, numbness and headaches.   Psychiatric/Behavioral: Negative for dysphoric mood and sleep disturbance. The patient is not nervous/anxious.        Allergies   Allergen Reactions   • Morphine And Related Shortness Of Breath   • Percocet [Oxycodone-Acetaminophen] Shortness Of Breath   • Sulfa Antibiotics Shortness Of Breath   • Demerol [Meperidine] Hallucinations   • Erythromycin Unknown - Low Severity     Stomach pains   • Penicillins Swelling   • Epinephrine Anxiety       Past Medical History:   Diagnosis Date   • Anemia    • Anxiety  I don   • Arm vein blood clot, right 2007    on estrogen cream   • Colitis    • Depression    • Fatty liver    • GERD (gastroesophageal reflux disease)    • Hyperlipidemia     last lipids 257/63/163/155   • Hypertension    • Left atrial enlargement 06/04/2015    moderate left atrial enlargement with normal LVedp per ECHO   • Liver lesion, left lobe    • Mitral regurgitation 06/04/2015    thickened mitral leaflets with mild-moderate MR on ECHO   • Obesity    • Osteopenia 08/28/2012    DEXA showed osteopenia L-spine and left hip with T scores -2.2 and -2   • Osteoporosis    • Shingles    • TR (tricuspid regurgitation) 06/04/2015    moderate TR on ECHO       Social History     Socioeconomic History   • Marital status:      Spouse name: Not on file   • Number of children: Not on file   • Years of education: Not on file   • Highest education level: Not on file   Tobacco Use   • Smoking status: Never Smoker   • Smokeless tobacco: Never Used   Substance and Sexual Activity   • Alcohol use: No   • Drug use: No   • Sexual activity: Never        Past Surgical History:   Procedure Laterality Date   • APPENDECTOMY     • HYSTERECTOMY     • TOTAL ABDOMINAL HYSTERECTOMY WITH SALPINGO OOPHORECTOMY  02/22/2001   • TUBAL ABDOMINAL LIGATION  1980       Family History   Problem Relation Age of Onset   • Heart attack Mother    • Cancer Mother         bladder   • Hypertension Mother    • Hyperlipidemia Mother    • Glaucoma Father    • Bradycardia Father    • Rheum arthritis Sister    • Mental illness Brother         schizophrenia   • Tuberculosis Son    • Stroke Paternal Grandmother    • Thyroid disease Paternal Grandmother    • Osteoporosis Sister    • Migraines Sister    • Mental illness Sister         schizophrenia   • Cancer Sister 43        breast   • Liver disease Sister         SAGE   • Obesity Sister    • Cancer Sister    • Liver disease Sister    • Mental illness Sister    • Mental illness Brother          Current  "Outpatient Medications:   •  Cholecalciferol (VITAMIN D3) 5000 units capsule capsule, Take 5,000 Units by mouth Daily. Twice a week, Disp: , Rfl:   •  coenzyme Q10 100 MG capsule, Take 100 mg by mouth Daily., Disp: , Rfl:   •  Multiple Vitamins-Minerals (MULTIVITAMIN WITH MINERALS) tablet tablet, Take 1 tablet by mouth Daily., Disp: , Rfl:   •  vitamin C (ASCORBIC ACID) 500 MG tablet, Take 500 mg by mouth 2 (Two) Times a Day., Disp: , Rfl:   •  cefdinir (OMNICEF) 300 MG capsule, Take 1 capsule by mouth 2 (Two) Times a Day., Disp: 14 capsule, Rfl: 0  •  fluconazole (DIFLUCAN) 150 MG tablet, TAKE ONE TAB PO X ONCE, CAN REPEAT IN 4 DAYS IF NEEDED FOR YEAST INFECTION, Disp: 2 tablet, Rfl: 0    Objective   /59   Pulse 53   Temp 98.1 °F (36.7 °C)   Resp 16   Ht 165.1 cm (65\")   Wt 73.9 kg (163 lb)   SpO2 98%   BMI 27.12 kg/m²     Physical Exam   Constitutional: She is oriented to person, place, and time. She appears well-developed and well-nourished.   HENT:   Head: Normocephalic and atraumatic.   Eyes: Conjunctivae are normal.   Neck: Normal range of motion. Neck supple.   Pulmonary/Chest: Effort normal.   Musculoskeletal: Normal range of motion.   Neurological: She is alert and oriented to person, place, and time.   Skin: No rash noted.   Left index finger with swelling distally around lateral nail, tender   Psychiatric: She has a normal mood and affect. Her behavior is normal.   Nursing note and vitals reviewed.      Assessment/Plan   Emmie was seen today for finger injury.    Diagnoses and all orders for this visit:    Paronychia of finger of left hand  -     cefdinir (OMNICEF) 300 MG capsule; Take 1 capsule by mouth 2 (Two) Times a Day.      Discussion Summary:  Patient is a 64 y.o. female presenting for follow up with left index finger paronychia, start omnicef along with epsom salt soaks and triple antibiotic topical.         Follow up:  Return if symptoms worsen or fail to improve.     "

## 2020-05-08 ENCOUNTER — LAB REQUISITION (OUTPATIENT)
Dept: LAB | Facility: HOSPITAL | Age: 65
End: 2020-05-08

## 2020-05-08 ENCOUNTER — OFFICE VISIT (OUTPATIENT)
Dept: INTERNAL MEDICINE | Facility: CLINIC | Age: 65
End: 2020-05-08

## 2020-05-08 VITALS
OXYGEN SATURATION: 97 % | TEMPERATURE: 98.2 F | BODY MASS INDEX: 27.19 KG/M2 | WEIGHT: 163.19 LBS | HEART RATE: 56 BPM | SYSTOLIC BLOOD PRESSURE: 130 MMHG | DIASTOLIC BLOOD PRESSURE: 70 MMHG | HEIGHT: 65 IN

## 2020-05-08 DIAGNOSIS — M79.10 MYALGIA: ICD-10-CM

## 2020-05-08 DIAGNOSIS — Z11.59 ENCOUNTER FOR SCREENING FOR OTHER VIRAL DISEASES: ICD-10-CM

## 2020-05-08 DIAGNOSIS — Z20.822 EXPOSURE TO COVID-19 VIRUS: ICD-10-CM

## 2020-05-08 DIAGNOSIS — R10.84 GENERALIZED ABDOMINAL PAIN: Primary | ICD-10-CM

## 2020-05-08 PROCEDURE — U0002 COVID-19 LAB TEST NON-CDC: HCPCS | Performed by: FAMILY MEDICINE

## 2020-05-08 PROCEDURE — U0002 COVID-19 LAB TEST NON-CDC: HCPCS | Performed by: INTERNAL MEDICINE

## 2020-05-08 PROCEDURE — 99214 OFFICE O/P EST MOD 30 MIN: CPT | Performed by: FAMILY MEDICINE

## 2020-05-08 PROCEDURE — U0004 COV-19 TEST NON-CDC HGH THRU: HCPCS | Performed by: INTERNAL MEDICINE

## 2020-05-08 NOTE — PROGRESS NOTES
"Subjective    Emmie Ester Ji is a 64 y.o. female here for:  Chief Complaint   Patient presents with   • Back Pain     Low back pain started on 4/16. Had muscle pain down her spine and into her head at times. Denies trauma to her back or neck. Taking Tylenol, sleeping with ice on her neck, and used traction but nothing is helping. Cant recall anything that would have caused the pain.    • Abdominal Pain     Started Wednesday morning and progressively gotten worse. Nausea but no vomititng. Pain stays mainly in the mid abdomen but will go higher or lower at times. Denies pain and burning upon urination. Pt worried about COVID as she has had the abdominal pain and muscles aches for weeks. In the beginning she did have sinus congestion. Unsure of fever, denies cough. Has had hot flashes.         History per MA reviewed.    History of Present Illness   Congestion initially with some ear pain, but has allergies. No chest congestion, no shortness of breath. No change in taste or smell. No fevers but has felt hot flashes, like she's fighting something off. \"Crazy pain\" up and down spine and in neck and head first two weeks. Abdominal pain started two days ago, at first thought it was food related but worsened later that evening, had nausea. Pain not always in same place. Sometimes low, sometimes upper. Seems to be getting better. She thinks she had the flu, she was exposed to a patient with flu-like symptoms. On Sunday (five days ago) she [facility], was treated with plaquenil, she reports. She is aware patients can continue to shed virus after infection. She thinks he was in the hospital in March.    Patient does not want to take any medicine, wishes to continue tylenol as needed. When asked how I can help her today, she explains she came in as she wanted to know if she could have COVID-19.    Recalls having abnormal abdominal imaging in 2017 and says she didn't " "do the follow up testing, as liver enzymes had looked okay, and she's not had any abdominal issues, except a brief GI issue last year or so that was short lived.    The following portions of the patient's history were reviewed and updated as appropriate: allergies, current medications, past family history, past medical history, past social history, past surgical history and problem list.    Review of Systems   Constitutional: Positive for activity change.   Gastrointestinal: Positive for abdominal pain.   Musculoskeletal: Positive for arthralgias and myalgias.       Visit Vitals  /70   Pulse 56   Temp 98.2 °F (36.8 °C) (Temporal)   Ht 165.1 cm (65\")   Wt 74 kg (163 lb 3 oz)   SpO2 97%   BMI 27.16 kg/m²         Objective   Physical Exam   Constitutional: She is oriented to person, place, and time. Vital signs are normal. She appears well-developed and well-nourished. She is active.  Non-toxic appearance. She does not appear ill. No distress. Face mask in place.   HENT:   Head: Normocephalic and atraumatic.   Right Ear: Hearing normal.   Left Ear: Hearing normal.   Eyes: EOM are normal. No scleral icterus.   Neck: Phonation normal. Neck supple.   Pulmonary/Chest: Effort normal.   Neurological: She is alert and oriented to person, place, and time. She displays no tremor. No cranial nerve deficit.   Skin: Skin is warm. No rash noted. She is not diaphoretic. No pallor.   Psychiatric: She has a normal mood and affect. Her speech is normal and behavior is normal. Judgment and thought content normal. Cognition and memory are normal.   Nursing note and vitals reviewed.       Per precharting there were CT scans of abdomen from 11/2017 that were not completed, ordered due to abnormal liver ultrasound 11/13/17:  Results for orders placed during the hospital encounter of 11/13/17   US liver    Narrative EXAMINATION: US LIVER- 11/13/2017     INDICATION: transaminitis; R74.0-Nonspecific elevation of levels of  transaminase " and lactic acid dehydrogenase (ldh)      TECHNIQUE: Ultrasound right upper quadrant abdomen     COMPARISON: NONE     FINDINGS: Visualized portions of the pancreas within normal limits.  Liver demonstrates diffusely increased echogenicity throughout and  contains a hypoechoic 3 x 1 x 3 cm lesion within the left hepatic lobe  without internal flow on Doppler interrogation. Gallbladder is present  without evidence of cholelithiasis, gallbladder wall thickening or  pericholecystic fluid. Common bile duct measures 6 mm in diameter at the  level of the sandee hepatis within normal limits.     Right kidney measures 11 cm in length without evidence of  hydronephrosis, contour deforming mass or obvious calculi.       Impression 1. Diffusely increased echogenicity throughout the liver parenchyma  consistent with parenchymal disease process such as hepatic steatosis.  2. Nonspecific 3 cm hypoechoic lesion within the left hepatic lobe. CT  liver protocol recommended to provide further characterization.     D:  11/13/2017  E:  11/13/2017         This report was finalized on 11/13/2017 1:32 PM by Dr. Manjeet Ackerman.              Assessment/Plan     Problem List Items Addressed This Visit     None      Visit Diagnoses     Generalized abdominal pain    -  Primary    Relevant Orders    QUESTIONNAIRE SERIES (Completed)    Myalgia        Relevant Orders    QUESTIONNAIRE SERIES (Completed)    Exposure to Covid-19 Virus        Relevant Orders    QUESTIONNAIRE SERIES (Completed)          · Discussed video visit options. If needs testing for COVID-19 should go to [facility]. Discussed her symptoms are concerning for COVID-19 and she must self quarantine x 14 days, work from home if possible. She wanted to do labs but I declined to order as I didn't want to expose any further staff members. I also kept social distance of at least 6 feet during most of the encounter, except for when I provided her letter to stay home. Both patient and I wore masks  during encounter, though I did not have an N95 on at the time. patient did not answer positive to any screening questions prior to coming up to clinic. Discussed video visit option if anything changes and if her abdominal issues persists needs to consider the liver imaging that had been recommended in the past.    Enid Alfaro MD

## 2020-05-09 LAB
REF LAB TEST METHOD: NORMAL
SARS-COV-2 RNA RESP QL NAA+PROBE: NOT DETECTED

## 2020-05-11 ENCOUNTER — TELEPHONE (OUTPATIENT)
Dept: INTERNAL MEDICINE | Facility: CLINIC | Age: 65
End: 2020-05-11

## 2020-05-11 ENCOUNTER — LAB REQUISITION (OUTPATIENT)
Dept: LAB | Facility: HOSPITAL | Age: 65
End: 2020-05-11

## 2020-05-11 DIAGNOSIS — R05.9 COUGH: ICD-10-CM

## 2020-05-11 DIAGNOSIS — R50.9 FEVER, UNSPECIFIED: ICD-10-CM

## 2020-05-11 DIAGNOSIS — R53.83 OTHER FATIGUE: ICD-10-CM

## 2020-05-11 DIAGNOSIS — R06.00 DYSPNEA, UNSPECIFIED: ICD-10-CM

## 2020-05-11 PROCEDURE — U0004 COV-19 TEST NON-CDC HGH THRU: HCPCS | Performed by: INTERNAL MEDICINE

## 2020-05-11 PROCEDURE — U0002 COVID-19 LAB TEST NON-CDC: HCPCS | Performed by: INTERNAL MEDICINE

## 2020-05-11 NOTE — TELEPHONE ENCOUNTER
PT CALLED IN STATED SHE WAS SEEN BY PHYSICIAN ON 5/8/20 AND WAS DIAGNOSED WITH COVID SYMPTOMS PT IS REQUESTING A LETTER TO BE FAXED TO EMPLOYER STATING SHE HAD COVID SYMPTOMS AND WAS TO BE QUARANTINED AT HOME FOR 14DYS AND IF POSSIBLE LET PT WORK FROM HOME PLEASE ADVISE      PT CB NUMBER: [phone]  [facility] [place] ATTN  CHANA GRANGER  [phone]  WORK #[phone]

## 2020-05-12 LAB
REF LAB TEST METHOD: NORMAL
SARS-COV-2 RNA RESP QL NAA+PROBE: NOT DETECTED

## 2020-05-12 NOTE — TELEPHONE ENCOUNTER
Spoke with patient and she states she has the letter Dr. Alfaro gave her, but she didn't want to take it into work, wanted us to fax it to them instead. She did say she just uploaded the note to her phone and sent it to her phone via email. Advised to call if she needs anything else.

## 2020-08-25 ENCOUNTER — TELEPHONE (OUTPATIENT)
Dept: INTERNAL MEDICINE | Facility: CLINIC | Age: 65
End: 2020-08-25

## 2020-11-06 ENCOUNTER — LAB REQUISITION (OUTPATIENT)
Dept: LAB | Facility: HOSPITAL | Age: 65
End: 2020-11-06

## 2020-11-06 DIAGNOSIS — Z11.59 ENCOUNTER FOR SCREENING FOR OTHER VIRAL DISEASES: ICD-10-CM

## 2020-11-06 PROCEDURE — U0004 COV-19 TEST NON-CDC HGH THRU: HCPCS | Performed by: INTERNAL MEDICINE

## 2020-11-07 LAB — SARS-COV-2 RNA RESP QL NAA+PROBE: NOT DETECTED

## 2020-12-21 ENCOUNTER — TELEMEDICINE (OUTPATIENT)
Dept: INTERNAL MEDICINE | Facility: CLINIC | Age: 65
End: 2020-12-21

## 2020-12-21 DIAGNOSIS — E27.40 ADRENAL INSUFFICIENCY (HCC): ICD-10-CM

## 2020-12-21 DIAGNOSIS — R53.83 MALAISE AND FATIGUE: ICD-10-CM

## 2020-12-21 DIAGNOSIS — R00.1 BRADYCARDIA: ICD-10-CM

## 2020-12-21 DIAGNOSIS — I95.9 HYPOTENSION, UNSPECIFIED HYPOTENSION TYPE: Primary | ICD-10-CM

## 2020-12-21 DIAGNOSIS — R53.81 MALAISE AND FATIGUE: ICD-10-CM

## 2020-12-21 DIAGNOSIS — E78.2 MIXED HYPERLIPIDEMIA: ICD-10-CM

## 2020-12-21 DIAGNOSIS — E27.40: ICD-10-CM

## 2020-12-21 PROCEDURE — 99214 OFFICE O/P EST MOD 30 MIN: CPT | Performed by: PHYSICIAN ASSISTANT

## 2020-12-21 RX ORDER — MIDODRINE HYDROCHLORIDE 2.5 MG/1
2.5 TABLET ORAL
Qty: 90 TABLET | Refills: 0 | Status: SHIPPED | OUTPATIENT
Start: 2020-12-21 | End: 2022-08-25

## 2020-12-21 NOTE — PROGRESS NOTES
You have chosen to receive care through a telehealth visit.  Do you consent to use a video/audio connection for your medical care today? Yes  Active parties: Marissa Logan PA-C, Yamile Palacios CMA, Emmie Ji        Emmie Ji is a 65 y.o. female.     Subjective   History of Present Illness   Patient previously unknown to me with history significant for adrenal hypofunction presents today via video visit with multiple concerns.  She reports that for multiple years she has had recurring episodes of fatigue and generalized feeling of weakness and reports that she again began feeling poorly around 2 weeks ago with progressive worsening since onset.  She has had trouble with constipation intermittently for many years but reports that constipation has been much worse in the last couple of weeks, although she is having a small bowel movement each day.  Due to long-term trouble with constipation she has also had difficulty with hemorrhoids and reports that she sometimes has a little bright red blood per rectum when wiping.  She has also been bothered by some urinary incontinence for the last 2-1/2 weeks which has seemed to improve since tapering off of L-tyrosine.  She denies any fever, chills, low back pain, flank pain, lower abdominal pain, dysuria, hematuria, increased frequency or urgency.  She denies any recent pallor, rash, arthralgias, myalgias, chest pain, leg edema, orthopnea, cough, SOA, wheezing, chest tightness, sore throat, headache, visual disturbances, focal weakness or numbness.    She reports that long-term she has had low blood pressure typically running 80s/40s and heart rate  Of 58-61 which she was able to raise to 90s/40s and hear rate of 70s with use of L-tyrosine and iodine supplementation which she began in September but decided to gradually discontinue starting about 1 week ago.        The following portions of the patient's history were reviewed and updated as appropriate: allergies, current  medications, past family history, past medical history, past social history, past surgical history and problem list.    Review of Systems   Constitutional: Positive for activity change and fatigue. Negative for chills, fever, unexpected weight gain and unexpected weight loss.        Malaise  Generalized feeling of weakness   HENT: Negative.    Eyes: Negative.  Negative for blurred vision, double vision, itching and visual disturbance.   Respiratory: Negative for cough, chest tightness, shortness of breath and wheezing.    Cardiovascular: Negative for chest pain, palpitations and leg swelling.        Low heart rate and low blood pressure   Gastrointestinal: Positive for anal bleeding and constipation. Negative for abdominal pain, diarrhea, nausea, vomiting and indigestion.   Endocrine: Negative for cold intolerance, heat intolerance, polydipsia, polyphagia and polyuria.   Genitourinary: Positive for urinary incontinence. Negative for difficulty urinating, dysuria, frequency, pelvic pain and urgency.   Musculoskeletal: Negative.  Negative for arthralgias, gait problem and myalgias.   Skin: Negative.  Negative for pallor, rash and bruise.   Allergic/Immunologic: Negative for immunocompromised state.   Neurological: Positive for weakness ( Generalized feeling of weakness). Negative for dizziness, tremors, syncope, speech difficulty, light-headedness, numbness, headache, memory problem and confusion.   Hematological: Negative for adenopathy. Does not bruise/bleed easily.   Psychiatric/Behavioral: Negative for agitation, sleep disturbance and depressed mood. The patient is not nervous/anxious.          Objective    Physical Exam  Nursing note reviewed.   Constitutional:       General: She is not in acute distress.     Appearance: Normal appearance. She is well-developed. She is not ill-appearing, toxic-appearing or diaphoretic.   HENT:      Head: Normocephalic and atraumatic.   Eyes:      General: No scleral icterus.      Extraocular Movements: Extraocular movements intact.      Pupils: Pupils are equal, round, and reactive to light.   Neck:      Comments: Neck appears normal    Pulmonary:      Effort: Pulmonary effort is normal. No respiratory distress.   Skin:     Coloration: Skin is not pale.      Findings: No erythema or rash.   Neurological:      Mental Status: She is alert and oriented to person, place, and time.      Coordination: Coordination normal.   Psychiatric:         Mood and Affect: Mood normal.         Thought Content: Thought content normal.         Judgment: Judgment normal.           There were no vitals taken for this visit.    Nursing note and vitals reviewed.          Assessment/Plan   Diagnoses and all orders for this visit:    1. Hypotension, unspecified hypotension type (Primary)  -     midodrine (PROAMATINE) 2.5 MG tablet; Take 1 tablet by mouth 3 (Three) Times a Day Before Meals.  Dispense: 90 tablet; Refill: 0  -     TSH  -     T4, Free  -     Thyroid Peroxidase Antibody  -     CBC & Differential  -     Comprehensive Metabolic Panel  -     Cortisol  -     Urinalysis With Culture If Indicated - Urine, Clean Catch    2. Adrenal insufficiency (CMS/HCC)  -     Cortisol    3. Adrenal cortex hypofunction (CMS/HCC)  -     Cortisol    4. Bradycardia  -     Cortisol    5. Malaise and fatigue  -     Vitamin B12  -     Folate  -     TSH  -     T4, Free  -     Thyroid Peroxidase Antibody  -     CBC & Differential  -     Comprehensive Metabolic Panel  -     Vitamin D 25 Hydroxy  -     Cortisol  -     Urinalysis With Culture If Indicated - Urine, Clean Catch    6. Mixed hyperlipidemia  -     Lipid Panel      Initiate lab work-up as stated above.  Strongly encouraged her to initiate midodrine due to hypotension. Risks, benefits and potential side effects of medication reviewed and patient agrees to try the medication but is hesitant as she states she does not like to take medication.     ER with any acutely worsened  symptoms.         Return if symptoms worsen or fail to improve.      KRISHNA Reinoso  12/21/2020  16:56 EST

## 2020-12-24 DIAGNOSIS — E67.3 HYPERVITAMINOSIS D: Primary | ICD-10-CM

## 2020-12-24 DIAGNOSIS — E78.2 MIXED HYPERLIPIDEMIA: ICD-10-CM

## 2020-12-24 LAB
25(OH)D3+25(OH)D2 SERPL-MCNC: 71.3 NG/ML (ref 30–100)
ALBUMIN SERPL-MCNC: 4.2 G/DL (ref 3.5–5.2)
ALBUMIN/GLOB SERPL: 2.2 G/DL
ALP SERPL-CCNC: 68 U/L (ref 39–117)
ALT SERPL-CCNC: 24 U/L (ref 1–33)
APPEARANCE UR: CLEAR
AST SERPL-CCNC: 20 U/L (ref 1–32)
BACTERIA #/AREA URNS HPF: ABNORMAL /HPF
BACTERIA UR CULT: NORMAL
BACTERIA UR CULT: NORMAL
BASOPHILS # BLD AUTO: 0.04 10*3/MM3 (ref 0–0.2)
BASOPHILS NFR BLD AUTO: 0.9 % (ref 0–1.5)
BILIRUB SERPL-MCNC: 0.5 MG/DL (ref 0–1.2)
BILIRUB UR QL STRIP: NEGATIVE
BUN SERPL-MCNC: 9 MG/DL (ref 8–23)
BUN/CREAT SERPL: 11.5 (ref 7–25)
CALCIUM SERPL-MCNC: 9.3 MG/DL (ref 8.6–10.5)
CHLORIDE SERPL-SCNC: 105 MMOL/L (ref 98–107)
CHOLEST SERPL-MCNC: 233 MG/DL (ref 0–200)
CO2 SERPL-SCNC: 26.5 MMOL/L (ref 22–29)
COLOR UR: YELLOW
CORTIS SERPL-MCNC: 18.1 UG/DL
CREAT SERPL-MCNC: 0.78 MG/DL (ref 0.57–1)
CRYSTALS URNS MICRO: ABNORMAL
EOSINOPHIL # BLD AUTO: 0.11 10*3/MM3 (ref 0–0.4)
EOSINOPHIL NFR BLD AUTO: 2.5 % (ref 0.3–6.2)
EPI CELLS #/AREA URNS HPF: ABNORMAL /HPF (ref 0–10)
ERYTHROCYTE [DISTWIDTH] IN BLOOD BY AUTOMATED COUNT: 12.4 % (ref 12.3–15.4)
FOLATE SERPL-MCNC: 16 NG/ML (ref 4.78–24.2)
GLOBULIN SER CALC-MCNC: 1.9 GM/DL
GLUCOSE SERPL-MCNC: 99 MG/DL (ref 65–99)
GLUCOSE UR QL: NEGATIVE
HCT VFR BLD AUTO: 39.2 % (ref 34–46.6)
HDLC SERPL-MCNC: 70 MG/DL (ref 40–60)
HGB BLD-MCNC: 13.4 G/DL (ref 12–15.9)
HGB UR QL STRIP: NEGATIVE
IMM GRANULOCYTES # BLD AUTO: 0.01 10*3/MM3 (ref 0–0.05)
IMM GRANULOCYTES NFR BLD AUTO: 0.2 % (ref 0–0.5)
KETONES UR QL STRIP: NEGATIVE
LDLC SERPL CALC-MCNC: 144 MG/DL (ref 0–100)
LEUKOCYTE ESTERASE UR QL STRIP: ABNORMAL
LYMPHOCYTES # BLD AUTO: 1.65 10*3/MM3 (ref 0.7–3.1)
LYMPHOCYTES NFR BLD AUTO: 38.2 % (ref 19.6–45.3)
MCH RBC QN AUTO: 30.9 PG (ref 26.6–33)
MCHC RBC AUTO-ENTMCNC: 34.2 G/DL (ref 31.5–35.7)
MCV RBC AUTO: 90.3 FL (ref 79–97)
MICRO URNS: ABNORMAL
MONOCYTES # BLD AUTO: 0.33 10*3/MM3 (ref 0.1–0.9)
MONOCYTES NFR BLD AUTO: 7.6 % (ref 5–12)
MUCOUS THREADS URNS QL MICRO: PRESENT /HPF
NEUTROPHILS # BLD AUTO: 2.18 10*3/MM3 (ref 1.7–7)
NEUTROPHILS NFR BLD AUTO: 50.6 % (ref 42.7–76)
NITRITE UR QL STRIP: NEGATIVE
NRBC BLD AUTO-RTO: 0 /100 WBC (ref 0–0.2)
PH UR STRIP: 6 [PH] (ref 5–7.5)
PLATELET # BLD AUTO: 212 10*3/MM3 (ref 140–450)
POTASSIUM SERPL-SCNC: 4 MMOL/L (ref 3.5–5.2)
PROT SERPL-MCNC: 6.1 G/DL (ref 6–8.5)
PROT UR QL STRIP: NEGATIVE
RBC # BLD AUTO: 4.34 10*6/MM3 (ref 3.77–5.28)
RBC #/AREA URNS HPF: ABNORMAL /HPF (ref 0–2)
SODIUM SERPL-SCNC: 139 MMOL/L (ref 136–145)
SP GR UR: 1.01 (ref 1–1.03)
T4 FREE SERPL-MCNC: 1.22 NG/DL (ref 0.93–1.7)
THYROPEROXIDASE AB SERPL-ACNC: <9 IU/ML (ref 0–34)
TRIGL SERPL-MCNC: 110 MG/DL (ref 0–150)
TSH SERPL DL<=0.005 MIU/L-ACNC: 1.53 UIU/ML (ref 0.27–4.2)
UNIDENT CRYS URNS QL MICRO: PRESENT /LPF
URINALYSIS REFLEX: ABNORMAL
UROBILINOGEN UR STRIP-MCNC: 0.2 MG/DL (ref 0.2–1)
VIT B12 SERPL-MCNC: 891 PG/ML (ref 211–946)
VLDLC SERPL CALC-MCNC: 19 MG/DL (ref 5–40)
WBC # BLD AUTO: 4.32 10*3/MM3 (ref 3.4–10.8)
WBC #/AREA URNS HPF: ABNORMAL /HPF (ref 0–5)

## 2020-12-24 RX ORDER — PRAVASTATIN SODIUM 20 MG
20 TABLET ORAL NIGHTLY
Qty: 90 TABLET | Refills: 3 | Status: SHIPPED | OUTPATIENT
Start: 2020-12-24 | End: 2022-08-25

## 2020-12-30 LAB
1,25(OH)2D SERPL-MCNC: 76.2 PG/ML (ref 19.9–79.3)
PTH-INTACT SERPL-MCNC: 57 PG/ML (ref 15–65)

## 2021-01-04 DIAGNOSIS — R53.81 MALAISE AND FATIGUE: Primary | ICD-10-CM

## 2021-01-04 DIAGNOSIS — R53.83 MALAISE AND FATIGUE: Primary | ICD-10-CM

## 2021-01-22 ENCOUNTER — HOSPITAL ENCOUNTER (OUTPATIENT)
Dept: ULTRASOUND IMAGING | Facility: HOSPITAL | Age: 66
Discharge: HOME | End: 2021-01-22
Admitting: PHYSICIAN ASSISTANT

## 2021-01-22 ENCOUNTER — TELEPHONE (OUTPATIENT)
Dept: INTERNAL MEDICINE | Facility: CLINIC | Age: 66
End: 2021-01-22

## 2021-01-22 PROCEDURE — 76536 US EXAM OF HEAD AND NECK: CPT

## 2021-01-22 NOTE — TELEPHONE ENCOUNTER
Pt wanted to know if the US gets the Parathyroid, and why her PTH was at a level that is higher than when most people with Parathyroid issues have trouble, and does she need a T3    She is ok with you giving this info in a Patient Mychart message

## 2021-01-25 NOTE — TELEPHONE ENCOUNTER
Parathyroid glands are not visible on imaging both PTH and calcium levels were within normal meaning the parathyroid is not of any current concern.  There is no need to check a T3 level at this time.  If there are any further concerns with thyroid or parathyroid function I would be happy to refer her to endocrinology for more in-depth evaluation.

## 2021-02-24 ENCOUNTER — TELEPHONE (OUTPATIENT)
Dept: INTERNAL MEDICINE | Facility: CLINIC | Age: 66
End: 2021-02-24

## 2021-02-24 NOTE — TELEPHONE ENCOUNTER
PATIENT CALLED AND WOULD LIKE TO SEE ABOUT GETTING A TEST FOR TESTING HER ANTIBODIES. PATIENT SAW A PATIENT ON 2/8/2021 AND SHE STARTED FILLING BAD ON 2/12/2021. 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 SHE HAS BEEN FILLING BAD FOR ABOUT 12 DAYS NOW. 2/12 CHEST, TIGHTINESS, EAR PAIN AND A COUGH. ABODOMINAL PAIN AND INCREASE CONSTIPATION, SINUS PRESSURE AND PAIN, FATIGUE. PLEASE SEND MESSAGE THROUGH [facility].         CALL BACK NUMBER: [phone]

## 2021-02-24 NOTE — TELEPHONE ENCOUNTER
Spoke with pt and advised we are unable to order the COVID antibody test. Offered a video visit to discuss with a provider or extender. Pt declines today.

## 2021-02-24 NOTE — TELEPHONE ENCOUNTER
Talked to this pt earlier this morning and explained to her that we can not order the COVID antibodies test without routine lab orders and explained she is not due for the labs right now. Advised she make a video appt with a provider to discuss what she needs, like possibly a COVID test since she is currently sick. Pt declined.

## 2021-02-24 NOTE — TELEPHONE ENCOUNTER
Caller: Sebastien Ji    Relationship: Self    Best call back number: [phone]    What orders are you requesting (i.e. lab or imaging):   TSH, T4, PRE T3, CALCIUM LEVEL, PTH.  COVID ANTI-BODIES TEST.    In what timeframe would the patient need to come in: ASAP.  CALL SEBASTIEN WHEN ORDER IS READY.  OUT OF QUARANTINE SINCE 02/12/21.    Where will you receive your lab/imaging services: OFFICE.

## 2021-02-26 NOTE — TELEPHONE ENCOUNTER
Pt called back today upset that these test have not been ordered for her. Advsied I sent Dr. Alfaro the message, however, she has been in clinic with patient's all week. Messages are triaged in order of severity and Dr. Alfaro has not been able to get to those that are lower priority yet. Pt states in addition to the Thyroid, PTH, Calcium, she always wants her ACTH checked. Pt states she has a high deductible and knows she will have to pay for these tests. Pt also wants the COVID antibody test instead of the COVID test. Advised again we can not order this test, but offered when labs are drawn down stairs.

## 2021-02-26 NOTE — TELEPHONE ENCOUNTER
Request declined. I consulted with NOLA Logan, who saw her for some of these issues. We both feel endocrinology consult is the best for the patient. This was suggested previously. If she's agreeable I'll place consult.

## 2021-02-26 NOTE — TELEPHONE ENCOUNTER
Spoke with pt and advised Dr. Alfaro and NOLA Logan have declined to order these tests for her. Both have told her she needs to see endocrinology. Pt states she saw Dr. Mixon in the past but wants to see a holistic doctor now. She states she is going to see someone in [place], [place]. Pt states she really wants the COVID antibody test done. Advised again, we can not order that test and Dr. Alfaro is not ordered other tests for her. Pt states she needs the test for work. Advised again she can go have a COVID test done at an [facility]. Pt declines saying she just wants the antibody test. Advised that is not something we can order for her. She states she will wait until she goes to [place].

## 2021-03-19 ENCOUNTER — TELEPHONE (OUTPATIENT)
Dept: INTERNAL MEDICINE | Facility: CLINIC | Age: 66
End: 2021-03-19

## 2021-03-19 NOTE — TELEPHONE ENCOUNTER
Patient left message requesting a copy of her lab results. Can you please print and call patient to let her know they are ready and she can ? Ph # [phone].

## 2022-06-06 ENCOUNTER — TELEPHONE (OUTPATIENT)
Dept: INTERNAL MEDICINE | Facility: CLINIC | Age: 67
End: 2022-06-06

## 2022-06-06 NOTE — TELEPHONE ENCOUNTER
Caller: Emmie Ji    Relationship: Self    Best call back number:[phone]    Who is your current provider: JOSE     Who would you like your new provider to be: [facility]PE[facility]LES    What are your reasons for transferring care: PATIENT WOULD LIKE TO STAY WITH PROVIDER THAT IS CURRENTLY THE WHOLE FAMILY'S PROVIDER     Additional notes: PATIENT WOULD NEED TO GET WELCOME TO MEDICARE VISIT SCHEDULED IF TRANSFER OF CARE IS POSSIBLE

## 2022-08-25 ENCOUNTER — OFFICE VISIT (OUTPATIENT)
Dept: INTERNAL MEDICINE | Facility: CLINIC | Age: 67
End: 2022-08-25

## 2022-08-25 VITALS
RESPIRATION RATE: 16 BRPM | SYSTOLIC BLOOD PRESSURE: 162 MMHG | HEIGHT: 65 IN | DIASTOLIC BLOOD PRESSURE: 74 MMHG | TEMPERATURE: 97.8 F | WEIGHT: 155 LBS | HEART RATE: 62 BPM | OXYGEN SATURATION: 97 % | BODY MASS INDEX: 25.83 KG/M2

## 2022-08-25 DIAGNOSIS — Z00.00 MEDICARE WELCOME EXAM: Primary | ICD-10-CM

## 2022-08-25 DIAGNOSIS — Z00.00 ROUTINE GENERAL MEDICAL EXAMINATION AT A HEALTH CARE FACILITY: ICD-10-CM

## 2022-08-25 DIAGNOSIS — Z91.81 AT MODERATE RISK FOR FALL: ICD-10-CM

## 2022-08-25 PROCEDURE — 1170F FXNL STATUS ASSESSED: CPT | Performed by: INTERNAL MEDICINE

## 2022-08-25 PROCEDURE — 1159F MED LIST DOCD IN RCRD: CPT | Performed by: INTERNAL MEDICINE

## 2022-08-25 PROCEDURE — G0402 INITIAL PREVENTIVE EXAM: HCPCS | Performed by: INTERNAL MEDICINE

## 2022-08-25 RX ORDER — PYRIDOXINE HCL (VITAMIN B6) 100 MG
TABLET ORAL
COMMUNITY
Start: 2021-11-01

## 2022-08-25 RX ORDER — CALCIUM CARBONATE/VITAMIN D3 600 MG-10
TABLET ORAL
COMMUNITY
Start: 2021-11-01

## 2022-08-25 RX ORDER — MULTIVIT WITH MIN/MFOLATE/K2 340-15/3 G
POWDER (GRAM) ORAL
COMMUNITY
Start: 2021-11-01

## 2022-08-25 RX ORDER — CHLORAL HYDRATE 500 MG
CAPSULE ORAL
COMMUNITY
Start: 2021-11-01

## 2022-08-25 RX ORDER — MAGNESIUM GLYCINATE 100 MG
CAPSULE ORAL
COMMUNITY
Start: 2021-11-01

## 2022-08-25 NOTE — PROGRESS NOTES
The ABCs of the Annual Wellness Visit  Subsequent Medicare Wellness Visit    Chief Complaint   Patient presents with   • Welcome To Medicare      Subjective    History of Present Illness:  Emmie Ji is a 66 y.o. female who presents for a welcome to Medicare Wellness Visit.  Patient is here for initial visit, she states she has stopped taking any traditional medication and does not want any traditional medication, she goes to [facility] [place] and sees Dr. Trujillo and currently is taking supplements and has labs done which have been scanned into her chart, she states she has lupus but does not want to take any traditional medicine and saw cardiology in the past for pvcs, she declines all vaccinations and had an allergic reaction to a flu shot, she also declines mammograms and colonoscopies    The following portions of the patient's history were reviewed and   updated as appropriate: allergies, current medications, past family history, past medical history, past social history, past surgical history and problem list.    Compared to one year ago, the patient feels her physical   health is better.    Compared to one year ago, the patient feels her mental   health is better.    Recent Hospitalizations:  She was not admitted to the hospital during the last year.       Current Medical Providers:  Patient Care Team:  Reema Nieto MD as PCP - General (Internal Medicine)  Jessica Guajardo APRN    Outpatient Medications Prior to Visit   Medication Sig Dispense Refill   • Acetylcarnitine HCl (ACETYL L-CARNITINE PO)      • B Complex Vitamins (B-COMPLEX/B-12 PO)      • Magnesium Glycinate 100 MG capsule      • Multiple Vitamins-Minerals (MULTIVITAMIN WITH MINERALS) tablet tablet Take 1 tablet by mouth Daily.     • Omega-3 Fatty Acids (fish oil) 1000 MG capsule capsule      • pyridoxine (VITAMIN B-6) 100 MG tablet tablet      • Thiamine Mononitrate (B1) 100 MG tablet      • vitamin C (ASCORBIC  "ACID) 500 MG tablet Take 500 mg by mouth 2 (Two) Times a Day.     • Vitamin D-Vitamin K (K2-D3 5000) 5000-90 UNIT-MCG capsule      • vitamin E 100 UNIT capsule Take 100 Units by mouth Daily.     • Magnesium 100 MG capsule      • Cholecalciferol (VITAMIN D3) 5000 units capsule capsule Take 5,000 Units by mouth Daily. Twice a week     • coenzyme Q10 100 MG capsule Take 100 mg by mouth Daily.     • fluconazole (DIFLUCAN) 150 MG tablet TAKE ONE TAB PO X ONCE, CAN REPEAT IN 4 DAYS IF NEEDED FOR YEAST INFECTION 2 tablet 0   • midodrine (PROAMATINE) 2.5 MG tablet Take 1 tablet by mouth 3 (Three) Times a Day Before Meals. 90 tablet 0   • pravastatin (Pravachol) 20 MG tablet Take 1 tablet by mouth Every Night. 90 tablet 3     No facility-administered medications prior to visit.       No opioid medication identified on active medication list. I have reviewed chart for other potential  high risk medication/s and harmful drug interactions in the elderly.          Aspirin is not on active medication list.  Aspirin use is not indicated based on review of current medical condition/s. Risk of harm outweighs potential benefits.  .    Patient Active Problem List   Diagnosis   • Hyperlipidemia   • Chronic non-seasonal allergic rhinitis   • Bradycardia   • Adrenal cortex hypofunction (HCC)   • Adrenal insufficiency (HCC)   • PVC (premature ventricular contraction)   • Osteopenia determined by x-ray   • Paronychia of finger of left hand     Advance Care Planning  Advance Directive is not on file.  ACP discussion was held with the patient during this visit. Patient does not have an advance directive, declines further assistance.          Objective    Vitals:    08/25/22 0942   BP: 162/74   Pulse: 62   Resp: 16   Temp: 97.8 °F (36.6 °C)   SpO2: 97%   Weight: 70.3 kg (155 lb)   Height: 165.1 cm (65\")     Estimated body mass index is 25.79 kg/m² as calculated from the following:    Height as of this encounter: 165.1 cm (65\").    Weight as " of this encounter: 70.3 kg (155 lb).    BMI is >= 25 and <30. (Overweight) The following options were offered after discussion;: exercise counseling/recommendations      Does the patient have evidence of cognitive impairment? No    Physical Exam  Vitals and nursing note reviewed.   Constitutional:       General: She is not in acute distress.     Appearance: Normal appearance. She is not diaphoretic.   HENT:      Head: Normocephalic and atraumatic.      Right Ear: External ear normal.      Left Ear: External ear normal.      Nose: Nose normal.   Eyes:      Extraocular Movements: Extraocular movements intact.      Conjunctiva/sclera: Conjunctivae normal.   Neck:      Trachea: Trachea normal.   Cardiovascular:      Rate and Rhythm: Normal rate and regular rhythm.      Heart sounds: Normal heart sounds.   Pulmonary:      Effort: Pulmonary effort is normal. No respiratory distress.      Breath sounds: Normal breath sounds.   Abdominal:      General: Abdomen is flat.   Musculoskeletal:      Cervical back: Neck supple.      Comments: Moves all limbs   Skin:     General: Skin is warm.   Neurological:      Mental Status: She is alert and oriented to person, place, and time.      Comments: No gross motor or sensory deficits                 HEALTH RISK ASSESSMENT    Smoking Status:  Social History     Tobacco Use   Smoking Status Never Smoker   Smokeless Tobacco Never Used     Alcohol Consumption:  Social History     Substance and Sexual Activity   Alcohol Use No     Fall Risk Screen:    STEADI Fall Risk Assessment was completed, and patient is at MODERATE risk for falls. Assessment completed on:8/25/2022    Depression Screening:  PHQ-2/PHQ-9 Depression Screening 8/25/2022   Retired Total Score -   Little Interest or Pleasure in Doing Things 1-->several days   Feeling Down, Depressed or Hopeless 0-->not at all   PHQ-9: Brief Depression Severity Measure Score 1       Health Habits and Functional and Cognitive  Screening:  Functional & Cognitive Status 8/25/2022   Do you have difficulty preparing food and eating? No   Do you have difficulty bathing yourself, getting dressed or grooming yourself? No   Do you have difficulty using the toilet? No   Do you have difficulty moving around from place to place? No   Do you have trouble with steps or getting out of a bed or a chair? No   Current Diet Well Balanced Diet   Dental Exam Not up to date   Eye Exam Not up to date   Exercise (times per week) 6 times per week   Current Exercises Include House Cleaning;Walking   Do you need help using the phone?  No   Are you deaf or do you have serious difficulty hearing?  Yes   Do you need help with transportation? No   Do you need help shopping? No   Do you need help preparing meals?  No   Do you need help with housework?  No   Do you need help with laundry? No   Do you need help taking your medications? No   Do you need help managing money? No   Do you ever drive or ride in a car without wearing a seat belt? No   Have you felt unusual stress, anger or loneliness in the last month? No   Who do you live with? Alone   If you need help, do you have trouble finding someone available to you? No   Have you been bothered in the last four weeks by sexual problems? No   Do you have difficulty concentrating, remembering or making decisions? Yes       Age-appropriate Screening Schedule:  Refer to the list below for future screening recommendations based on patient's age, sex and/or medical conditions. Orders for these recommended tests are listed in the plan section. The patient has been provided with a written plan.    Health Maintenance   Topic Date Due   • LIPID PANEL  12/22/2021   • TDAP/TD VACCINES (3 - Td or Tdap) 07/26/2022   • ZOSTER VACCINE (1 of 2) 08/25/2022 (Originally 9/2/2005)   • MAMMOGRAM  08/25/2023 (Originally 12/9/2019)   • PAP SMEAR  Discontinued   • DXA SCAN  Discontinued              Assessment & Plan   CMS Preventative Services  Quick Reference  Risk Factors Identified During Encounter  Fall Risk-High or Moderate  The above risks/problems have been discussed with the patient.  Follow up actions/plans if indicated are seen below in the Assessment/Plan Section.  Pertinent information has been shared with the patient in the After Visit Summary.    Diagnoses and all orders for this visit:    1. Medicare welcome exam (Primary)    2. At moderate risk for fall    3. Routine general medical examination at a health care facility    Plan:  1.physical: Physical exam conducted today, scanned  lab work- brought in labs from [facility] in [place] , she states she declines all 'traditional medicine '  impression: healthy adult Patient. Currently, patient eats a healthy diet. Screening lab work includes glucose, lipid profile and complete blood count . The risks and benefits of immunizations were discussed and immunizations are all declined. Advice and education were given regarding nutrition and aerobic exercise. Patient discussion: discussed with the patient.  Welcome to Medicare and annual Wellness Visit  with preventive exam as well as age and risk appropriate counseling completed.   Advance Directive Planning: paperwork and instructions were given to the patient.   Patient Discussion: plan discussed with the patient, follow-up visit needed in one year. Medication Review and medication list updated        Follow Up:   Return in about 1 year (around 8/25/2023) for Medicare Wellness.     An After Visit Summary and PPPS were made available to the patient.

## 2022-08-25 NOTE — PATIENT INSTRUCTIONS
Advance Care Planning and Advance Directives     You make decisions on a daily basis - decisions about where you want to live, your career, your home, your life. Perhaps one of the most important decisions you face is your choice for future medical care. Take time to talk with your family and your healthcare team and start planning today.  Advance Care Planning is a process that can help you:  · Understand possible future healthcare decisions in light of your own experiences  · Reflect on those decision in light of your goals and values  · Discuss your decisions with those closest to you and the healthcare professionals that care for you  · Make a plan by creating a document that reflects your wishes    Surrogate Decision Maker  In the event of a medical emergency, which has left you unable to communicate or to make your own decisions, you would need someone to make decisions for you.  It is important to discuss your preferences for medical treatment with this person while you are in good health.     Qualities of a surrogate decision maker:  • Willing to take on this role and responsibility  • Knows what you want for future medical care  • Willing to follow your wishes even if they don't agree with them  • Able to make difficult medical decisions under stressful circumstances    Advance Directives  These are legal documents you can create that will guide your healthcare team and decision maker(s) when needed. These documents can be stored in the electronic medical record.    · Living Will - a legal document to guide your care if you have a terminal condition or a serious illness and are unable to communicate. States vary by statute in document names/types, but most forms may include one or more of the following:        -  Directions regarding life-prolonging treatments        -  Directions regarding artificially provided nutrition/hydration        -  Choosing a healthcare decision maker        -  Direction  regarding organ/tissue donation    · Durable Power of  for Healthcare - this document names an -in-fact to make medical decisions for you, but it may also allow this person to make personal and financial decisions for you. Please seek the advice of an  if you need this type of document.    **Advance Directives are not required and no one may discriminate against you if you do not sign one.    Medical Orders  Many states allow specific forms/orders signed by your physician to record your wishes for medical treatment in your current state of health. This form, signed in personal communication with your physician, addresses resuscitation and other medical interventions that you may or may not want.      For more information or to schedule a time with a [facility] Advance Care Planning Facilitator contact: [facility] or call [phone] and someone will contact you directly.    Fall Prevention in the Home, Adult  Falls can cause injuries and can affect people from all age groups. There are many simple things that you can do to make your home safe and to help prevent falls. Ask for help when making these changes, if needed.  What actions can I take to prevent falls?  General instructions  · Use good lighting in all rooms. Replace any light bulbs that burn out.  · Turn on lights if it is dark. Use night-lights.  · Place frequently used items in easy-to-reach places. Lower the shelves around your home if necessary.  · Set up furniture so that there are clear paths around it. Avoid moving your furniture around.  · Remove throw rugs and other tripping hazards from the floor.  · Avoid walking on wet floors.  · Fix any uneven floor surfaces.  · Add color or contrast paint or tape to grab bars and handrails in your home. Place contrasting color strips on the first and last steps of stairways.  · When you use a stepladder, make sure that it is completely opened and that the sides are  firmly locked. Have someone hold the ladder while you are using it. Do not climb a closed stepladder.  · Be aware of any and all pets.  What can I do in the bathroom?         · Keep the floor dry. Immediately clean up any water that spills onto the floor.  · Remove soap buildup in the tub or shower on a regular basis.  · Use non-skid mats or decals on the floor of the tub or shower.  · Attach bath mats securely with double-sided, non-slip rug tape.  · If you need to sit down while you are in the shower, use a plastic, non-slip stool.  · Install grab bars by the toilet and in the tub and shower. Do not use towel bars as grab bars.  What can I do in the bedroom?  · Make sure that a bedside light is easy to reach.  · Do not use oversized bedding that drapes onto the floor.  · Have a firm chair that has side arms to use for getting dressed.  What can I do in the kitchen?  · Clean up any spills right away.  · If you need to reach for something above you, use a sturdy step stool that has a grab bar.  · Keep electrical cables out of the way.  · Do not use floor polish or wax that makes floors slippery. If you must use wax, make sure that it is non-skid floor wax.  What can I do in the stairways?  · Do not leave any items on the stairs.  · Make sure that you have a light switch at the top of the stairs and the bottom of the stairs. Have them installed if you do not have them.  · Make sure that there are handrails on both sides of the stairs. Fix handrails that are broken or loose. Make sure that handrails are as long as the stairways.  · Install non-slip stair treads on all stairs in your home.  · Avoid having throw rugs at the top or bottom of stairways, or secure the rugs with carpet tape to prevent them from moving.  · Choose a carpet design that does not hide the edge of steps on the stairway.  · Check any carpeting to make sure that it is firmly attached to the stairs. Fix any carpet that is loose or worn.  What can I  do on the outside of my home?  · Use bright outdoor lighting.  · Regularly repair the edges of walkways and driveways and fix any cracks.  · Remove high doorway thresholds.  · Trim any shrubbery on the main path into your home.  · Regularly check that handrails are securely fastened and in good repair. Both sides of any steps should have handrails.  · Install guardrails along the edges of any raised decks or porches.  · Clear walkways of debris and clutter, including tools and rocks.  · Have leaves, snow, and ice cleared regularly.  · Use sand or salt on walkways during winter months.  · In the garage, clean up any spills right away, including grease or oil spills.  What other actions can I take?  · Wear closed-toe shoes that fit well and support your feet. Wear shoes that have rubber soles or low heels.  · Use mobility aids as needed, such as canes, walkers, scooters, and crutches.  · Review your medicines with your health care provider. Some medicines can cause dizziness or changes in blood pressure, which increase your risk of falling.  Talk with your health care provider about other ways that you can decrease your risk of falls. This may include working with a physical therapist or  to improve your strength, balance, and endurance.  Where to find more information  · Centers for Disease Control and Prevention, [place]: https://www.cdc.gov  · National [place] on Aging: https://vb3hjkz.stacey.nih.gov  Contact a health care provider if:  · You are afraid of falling at home.  · You feel weak, drowsy, or dizzy at home.  · You fall at home.  Summary  · There are many simple things that you can do to make your home safe and to help prevent falls.  · Ways to make your home safe include removing tripping hazards and installing grab bars in the bathroom.  · Ask for help when making these changes in your home.  This information is not intended to replace advice given to you by your health care provider. Make sure you  discuss any questions you have with your health care provider.  Document Revised: 11/30/2018 Document Reviewed: 08/02/2018  Elsevier Patient Education © 2021 [employer] Inc[employer]      Sit-to-Stand Exercise    The sit-to-stand exercise (also known as the chair stand or chair rise exercise) strengthens your lower body and helps you maintain or improve your mobility and independence. The goal is to do the sit-to-stand exercise without using your hands. This will be easier as you become stronger. You should always talk with your health care provider before starting any exercise program, especially if you have had recent surgery.  Do the exercise exactly as told by your health care provider and adjust it as directed. It is normal to feel mild stretching, pulling, tightness, or discomfort as you do this exercise, but you should stop right away if you feel sudden pain or your pain gets worse. Do not begin doing this exercise until told by your health care provider.  What the sit-to-stand exercise does  The sit-to-stand exercise helps to strengthen the muscles in your thighs and the muscles in the center of your body that give you stability (core muscles). This exercise is especially helpful if:  · You have had knee or hip surgery.  · You have trouble getting up from a chair, out of a car, or off the toilet.  How to do the sit-to-stand exercise  1. Sit toward the front edge of a sturdy chair without armrests. Your knees should be bent and your feet should be flat on the floor and shoulder-width apart.  2. Place your hands lightly on each side of the seat. Keep your back and neck as straight as possible, with your chest slightly forward.  3. Breathe in slowly. Lean forward and slightly shift your weight to the front of your feet.  4. Breathe out as you slowly stand up. Use your hands as little as possible.  5. Stand and pause for a full breath in and out.  6. Breathe in as you sit down slowly. Tighten your core and abdominal  muscles to control your lowering as much as possible.  7. Breathe out slowly.  8. Do this exercise 10-15 times. If needed, do it fewer times until you build up strength.  9. Rest for 1 minute, then do another set of 10-15 repetitions.  To change the difficulty of the sit-to-stand exercise  · If the exercise is too difficult, use a chair with sturdy armrests, and push off the armrests to help you come to the standing position. You can also use the armrests to help slowly lower yourself back to sitting. As this gets easier, try to use your arms less. You can also place a firm cushion or pillow on the chair to make the surface higher.  · If this exercise is too easy, do not use your arms to help raise or lower yourself. You can also wear a weighted vest, use hand weights, increase your repetitions, or try a lower chair.  General tips  · You may feel tired when starting an exercise routine. This is normal.  · You may have muscle soreness that lasts a few days. This is normal. As you get stronger, you may not feel muscle soreness.  · Use smooth, steady movements.  · Do not  hold your breath during strength exercises. This can cause unsafe changes in your blood pressure.  · Breathe in slowly through your nose, and breathe out slowly through your mouth.  Summary  · Strengthening your lower body is an important step to help you move safely and independently.  · The sit-to-stand exercise helps strengthen the muscles in your thighs and core.  · You should always talk with your health care provider before starting any exercise program, especially if you have had recent surgery.  This information is not intended to replace advice given to you by your health care provider. Make sure you discuss any questions you have with your health care provider.  Document Revised: 10/16/2019 Document Reviewed: 02/08/2018  [employer] Patient Education © 2021 [employer]    You are due for adacel Tdap vaccination. (provides protection against  tetanus, diptheria and whooping cough) Please  get the immunization at your local pharmacy at your earliest convenience. This immunization will next be due in 10 years. Please click on the link for more information about this vaccine.    [facility] Tdap Vaccine Information      Medicare Wellness  Personal Prevention Plan of Service     Date of Office Visit:    Encounter Provider:  Reema Nieto MD  Place of Service:  [facility]  Patient Name: Emmie Ji  :  1955    As part of the Medicare Wellness portion of your visit today, we are providing you with this personalized preventive plan of services (PPPS). This plan is based upon recommendations of the [place] Preventive Services Task Force (USPSTF) and the Advisory Committee on Immunization Practices (ACIP).    This lists the preventive care services that should be considered, and provides dates of when you are due. Items listed as completed are up-to-date and do not require any further intervention.    Health Maintenance   Topic Date Due   • LIPID PANEL  2021   • TDAP/TD VACCINES (3 - Td or Tdap) 2022   • ZOSTER VACCINE (1 of 2) 2022 (Originally 2005)   • COVID-19 Vaccine (1) 2022 (Originally 3/2/1956)   • Pneumococcal Vaccine 65+ (1 - PCV) 2023 (Originally 2020)   • MAMMOGRAM  2023 (Originally 2019)   • INFLUENZA VACCINE  10/01/2022   • ANNUAL WELLNESS VISIT  2023   • COLORECTAL CANCER SCREENING  10/31/2027   • HEPATITIS C SCREENING  Completed   • PAP SMEAR  Discontinued   • DXA SCAN  Discontinued       No orders of the defined types were placed in this encounter.      Return in about 1 year (around 2023) for Medicare Wellness.

## 2022-11-07 ENCOUNTER — TELEPHONE (OUTPATIENT)
Dept: INTERNAL MEDICINE | Facility: CLINIC | Age: 67
End: 2022-11-07

## 2022-11-07 NOTE — TELEPHONE ENCOUNTER
Caller: mEmie Ji    Relationship: Self    Best call back number: [phone]    What is the best time to reach you: ANYTIME    Who are you requesting to speak with (clinical staff, provider,  specific staff member): PROVIDER    Do you know the name of the person who called: SELF    What was the call regarding: PATIENT STATES THAT SHE WOULD LIKE A CALL ABOUT A JURY DUTY EXEMPTION LETTER. PATIENT STATES THAT SHE WOULD LIKE TO BE EXCUSED THE MONTHS OF December AND JANUARY.    Do you require a callback: YES

## 2023-08-30 ENCOUNTER — OFFICE VISIT (OUTPATIENT)
Dept: INTERNAL MEDICINE | Facility: CLINIC | Age: 68
End: 2023-08-30
Payer: MEDICARE

## 2023-08-30 VITALS
HEIGHT: 65 IN | HEART RATE: 53 BPM | OXYGEN SATURATION: 99 % | SYSTOLIC BLOOD PRESSURE: 157 MMHG | TEMPERATURE: 98 F | DIASTOLIC BLOOD PRESSURE: 82 MMHG | BODY MASS INDEX: 28.32 KG/M2 | RESPIRATION RATE: 16 BRPM | WEIGHT: 170 LBS

## 2023-08-30 DIAGNOSIS — Z00.00 MEDICARE ANNUAL WELLNESS VISIT, SUBSEQUENT: Primary | ICD-10-CM

## 2023-08-30 DIAGNOSIS — E78.2 MIXED HYPERLIPIDEMIA: ICD-10-CM

## 2023-08-30 DIAGNOSIS — Z12.11 COLON CANCER SCREENING: ICD-10-CM

## 2023-08-30 PROCEDURE — 1160F RVW MEDS BY RX/DR IN RCRD: CPT | Performed by: INTERNAL MEDICINE

## 2023-08-30 PROCEDURE — 1159F MED LIST DOCD IN RCRD: CPT | Performed by: INTERNAL MEDICINE

## 2023-08-30 PROCEDURE — G0439 PPPS, SUBSEQ VISIT: HCPCS | Performed by: INTERNAL MEDICINE

## 2023-08-30 NOTE — PROGRESS NOTES
The ABCs of the Annual Wellness Visit  Subsequent Medicare Wellness Visit    Chief Complaint   Patient presents with    Medicare Wellness-subsequent    Hyperlipidemia       Subjective      Emmie Ji is a 68 y.o. female who presents for a Subsequent Medicare Wellness Visit. She goes to [facility] for  her labs and medical care but will not be going this year , she declines mammogram and dexa scan  and vaccinations and colonoscopy  but is agreeable to cologuard    The following portions of the patient's history were reviewed and   updated as appropriate: allergies, current medications, past family history, past medical history, past social history, past surgical history, and problem list.    Compared to one year ago, the patient feels her physical   health is better.    Compared to one year ago, the patient feels her mental   health is the same.    Recent Hospitalizations:  She was not admitted to the hospital during the last year.       Current Medical Providers:  Patient Care Team:  Reema Nieto MD as PCP - General (Internal Medicine)  Jessica Guajardo APRN    Outpatient Medications Prior to Visit   Medication Sig Dispense Refill    B Complex Vitamins (B-COMPLEX/B-12 PO)       Magnesium Glycinate 100 MG capsule       Multiple Vitamins-Minerals (MULTIVITAMIN WITH MINERALS) tablet tablet Take 1 tablet by mouth Daily.      Omega-3 Fatty Acids (fish oil) 1000 MG capsule capsule       pyridoxine (VITAMIN B-6) 100 MG tablet tablet       Thiamine Mononitrate (B1) 100 MG tablet       vitamin C (ASCORBIC ACID) 500 MG tablet Take 1 tablet by mouth 2 (Two) Times a Day.      Vitamin D-Vitamin K (K2-D3 5000) 5000-90 UNIT-MCG capsule       vitamin E 100 UNIT capsule Take 1 capsule by mouth Daily.      Acetylcarnitine HCl (ACETYL L-CARNITINE PO)        No facility-administered medications prior to visit.       No opioid medication identified on active medication list. I have reviewed chart for other potential   "high risk medication/s and harmful drug interactions in the elderly.        Aspirin is not on active medication list.  Aspirin use is not indicated based on review of current medical condition/s. Risk of harm outweighs potential benefits.  .    Patient Active Problem List   Diagnosis    Hyperlipidemia    Chronic non-seasonal allergic rhinitis    Bradycardia    Adrenal cortex hypofunction    Adrenal insufficiency    PVC (premature ventricular contraction)    Osteopenia determined by x-ray    Paronychia of finger of left hand     Advance Care Planning   Advance Care Planning     Advance Directive is not on file.  ACP discussion was held with the patient during this visit. Patient has an advance directive (not in EMR), copy requested.     Objective    Vitals:    08/30/23 0917   BP: 157/82   Pulse: 53   Resp: 16   Temp: 98 °F (36.7 °C)   SpO2: 99%   Weight: 77.1 kg (170 lb)   Height: 165.1 cm (65\")   PainSc: 0-No pain     Estimated body mass index is 28.29 kg/m² as calculated from the following:    Height as of this encounter: 165.1 cm (65\").    Weight as of this encounter: 77.1 kg (170 lb).    BMI is >= 25 and <30. (Overweight) The following options were offered after discussion;: weight loss educational material (shared in after visit summary), exercise counseling/recommendations, and nutrition counseling/recommendations       Physical Examination  Vitals and nursing note reviewed  General appearance: Normocephalic and nontraumatic  HEENT: External inspection of eyes, ears and nose appears benign, hearing appears intact  Neck: Neck appears supple, trachea in midline, thyroid appears not enlarged  Respiratory: Respiratory effort appears normal  Musculoskeletal: Moving all limbs  Range of motion of visible joints appears within normal  CNS: No gross motor or sensory deficits  No gross cranial nerve deficits  No tremors  Psychiatry: Alert and oriented x3  Memory appears intact  Mood and affect appears normal  Insight " appears normal    Does the patient have evidence of cognitive impairment?   No            HEALTH RISK ASSESSMENT    Smoking Status:  Social History     Tobacco Use   Smoking Status Never   Smokeless Tobacco Never     Alcohol Consumption:  Social History     Substance and Sexual Activity   Alcohol Use No     Fall Risk Screen:    BROOKS Fall Risk Assessment was completed, and patient is at HIGH risk for falls. Assessment completed on:2023    Depression Screenin/30/2023     9:27 AM   PHQ-2/PHQ-9 Depression Screening   Little Interest or Pleasure in Doing Things 0-->not at all   Feeling Down, Depressed or Hopeless 0-->not at all   PHQ-9: Brief Depression Severity Measure Score 0       Health Habits and Functional and Cognitive Screenin/30/2023     9:22 AM   Functional & Cognitive Status   Do you have difficulty preparing food and eating? No   Do you have difficulty bathing yourself, getting dressed or grooming yourself? No   Do you have difficulty using the toilet? No   Do you have difficulty moving around from place to place? No   Do you have trouble with steps or getting out of a bed or a chair? No   Current Diet Well Balanced Diet   Dental Exam Not up to date   Eye Exam Not up to date   Exercise (times per week) 5 times per week   Current Exercises Include House Cleaning;Other;Walking   Do you need help using the phone?  No   Are you deaf or do you have serious difficulty hearing?  No   Do you need help to go to places out of walking distance? No   Do you need help shopping? No   Do you need help preparing meals?  No   Do you need help with housework?  No   Do you need help with laundry? No   Do you need help taking your medications? No   Do you need help managing money? No   Do you ever drive or ride in a car without wearing a seat belt? No   Have you felt unusual stress, anger or loneliness in the last month? No   Who do you live with? Alone   If you need help, do you have trouble finding  someone available to you? No   Have you been bothered in the last four weeks by sexual problems? No   Do you have difficulty concentrating, remembering or making decisions? No       Age-appropriate Screening Schedule:  Refer to the list below for future screening recommendations based on patient's age, sex and/or medical conditions. Orders for these recommended tests are listed in the plan section. The patient has been provided with a written plan.    Health Maintenance   Topic Date Due    LIPID PANEL  12/22/2021    BMI FOLLOWUP  08/25/2023    ZOSTER VACCINE (1 of 2) 08/26/2024 (Originally 9/2/2005)    Pneumococcal Vaccine 65+ (1 - PCV) 08/30/2024 (Originally 9/2/2020)    MAMMOGRAM  08/30/2024 (Originally 12/9/2019)    TDAP/TD VACCINES (3 - Td or Tdap) 08/30/2024 (Originally 7/26/2022)    COVID-19 Vaccine (1) 09/23/2024 (Originally 3/2/1956)    ANNUAL WELLNESS VISIT  08/30/2024    COLORECTAL CANCER SCREENING  10/31/2027    HEPATITIS C SCREENING  Completed    PAP SMEAR  Discontinued    DXA SCAN  Discontinued                  CMS Preventative Services Quick Reference  Risk Factors Identified During Encounter:  Declines  all    The above risks/problems have been discussed with the patient.  Pertinent information has been shared with the patient in the After Visit Summary.    Diagnoses and all orders for this visit:    1. Medicare annual wellness visit, subsequent (Primary)    2. Mixed hyperlipidemia  -     CBC & Differential  -     Comprehensive Metabolic Panel  -     Lipid Panel    3. Colon cancer screening  -     Cologuard - Stool, Per Rectum; Future    Plan:  1.physical: Physical exam conducted today,  obtain  fasting lab work,   Impression: healthy adult Patient. Currently, patient eats a healthy diet. Screening lab work includes glucose, lipid profile and complete blood count . The risks and benefits of immunizations were discussed and immunizations are  all declined, declines mammogram , dexa scan . Advice and  education were given regarding nutrition and aerobic exercise. Patient discussion: discussed with the patient.  Annual Wellness Visit  with preventive exam as well as age and risk appropriate counseling completed.   Advance Directive Planning: paperwork and instructions were given to the patient.   Patient Discussion: plan discussed with the patient, follow-up visit needed in one year. Medication Review and medication list updated.   2.mixed hyperlipidemia: will obtain   fasting CMP and lipid panel.  Diet and exercise counseled,    3. Screening for colon cancer : declines colonoscopy , agrees to cologuard    Follow Up:   Next Medicare Wellness visit to be scheduled in 1 year.      An After Visit Summary and PPPS were made available to the patient.

## 2023-08-30 NOTE — PATIENT INSTRUCTIONS
Advance Care Planning and Advance Directives     You make decisions on a daily basis - decisions about where you want to live, your career, your home, your life. Perhaps one of the most important decisions you face is your choice for future medical care. Take time to talk with your family and your healthcare team and start planning today.  Advance Care Planning is a process that can help you:  Understand possible future healthcare decisions in light of your own experiences  Reflect on those decision in light of your goals and values  Discuss your decisions with those closest to you and the healthcare professionals that care for you  Make a plan by creating a document that reflects your wishes    Surrogate Decision Maker  In the event of a medical emergency, which has left you unable to communicate or to make your own decisions, you would need someone to make decisions for you.  It is important to discuss your preferences for medical treatment with this person while you are in good health.     Qualities of a surrogate decision maker:  Willing to take on this role and responsibility  Knows what you want for future medical care  Willing to follow your wishes even if they don't agree with them  Able to make difficult medical decisions under stressful circumstances    Advance Directives  These are legal documents you can create that will guide your healthcare team and decision maker(s) when needed. These documents can be stored in the electronic medical record.    Living Will - a legal document to guide your care if you have a terminal condition or a serious illness and are unable to communicate. States vary by statute in document names/types, but most forms may include one or more of the following:        -  Directions regarding life-prolonging treatments        -  Directions regarding artificially provided nutrition/hydration        -  Choosing a healthcare decision maker        -  Direction regarding organ/tissue  donation    Durable Power of  for Healthcare - this document names an -in-fact to make medical decisions for you, but it may also allow this person to make personal and financial decisions for you. Please seek the advice of an  if you need this type of document.    **Advance Directives are not required and no one may discriminate against you if you do not sign one.    Medical Orders  Many states allow specific forms/orders signed by your physician to record your wishes for medical treatment in your current state of health. This form, signed in personal communication with your physician, addresses resuscitation and other medical interventions that you may or may not want.      For more information or to schedule a time with a [facility] Advance Care Planning Facilitator contact: [facility]/ACP or call [phone] and someone will contact you directly.  Fall Prevention in the Home, Adult  Falls can cause injuries and affect people of all ages. There are many simple things that you can do to make your home safe and to help prevent falls. Ask for help when making these changes, if needed.  What actions can I take to prevent falls?  General instructions  Use good lighting in all rooms. Replace any light bulbs that burn out, turn on lights if it is dark, and use night-lights.  Place frequently used items in easy-to-reach places. Lower the shelves around your home if necessary.  Set up furniture so that there are clear paths around it. Avoid moving your furniture around.  Remove throw rugs and other tripping hazards from the floor.  Avoid walking on wet floors.  Fix any uneven floor surfaces.  Add color or contrast paint or tape to grab bars and handrails in your home. Place contrasting color strips on the first and last steps of staircases.  When you use a stepladder, make sure that it is completely opened and that the sides and supports are firmly locked. Have someone hold the ladder  while you are using it. Do not climb a closed stepladder.  Know where your pets are when moving through your home.  What can I do in the bathroom?         Keep the floor dry. Immediately clean up any water that is on the floor.  Remove soap buildup in the tub or shower regularly.  Use nonskid mats or decals on the floor of the tub or shower.  Attach bath mats securely with double-sided, nonslip rug tape.  If you need to sit down while you are in the shower, use a plastic, nonslip stool.  Install grab bars by the toilet and in the tub and shower. Do not use towel bars as grab bars.  What can I do in the bedroom?  Make sure that a bedside light is easy to reach.  Do not use oversized bedding that reaches the floor.  Have a firm chair that has side arms to use for getting dressed.  What can I do in the kitchen?  Clean up any spills right away.  If you need to reach for something above you, use a sturdy step stool that has a grab bar.  Keep electrical cables out of the way.  Do not use floor polish or wax that makes floors slippery. If you must use wax, make sure that it is non-skid floor wax.  What can I do with my stairs?  Do not leave any items on the stairs.  Make sure that you have a light switch at the top and the bottom of the stairs. Have them installed if you do not have them.  Make sure that there are handrails on both sides of the stairs. Fix handrails that are broken or loose. Make sure that handrails are as long as the staircases.  Install non-slip stair treads on all stairs in your home.  Avoid having throw rugs at the top or bottom of stairs, or secure the rugs with carpet tape to prevent them from moving.  Choose a carpet design that does not hide the edge of steps on the stairs.  Check any carpeting to make sure that it is firmly attached to the stairs. Fix any carpet that is loose or worn.  What can I do on the outside of my home?  Use bright outdoor lighting.  Regularly repair the edges of walkways  and driveways and fix any cracks.  Remove high doorway thresholds.  Trim any shrubbery on the main path into your home.  Regularly check that handrails are securely fastened and in good repair. Both sides of all steps should have handrails.  Install guardrails along the edges of any raised decks or porches.  Clear walkways of debris and clutter, including tools and rocks.  Have leaves, snow, and ice cleared regularly.  Use sand or salt on walkways during winter months.  In the garage, clean up any spills right away, including grease or oil spills.  What other actions can I take?  Wear closed-toe shoes that fit well and support your feet. Wear shoes that have rubber soles or low heels.  Use mobility aids as needed, such as canes, walkers, scooters, and crutches.  Review your medicines with your health care provider. Some medicines can cause dizziness or changes in blood pressure, which increase your risk of falling.  Talk with your health care provider about other ways that you can decrease your risk of falls. This may include working with a physical therapist or  to improve your strength, balance, and endurance.  Where to find more information  Centers for Disease Control and Prevention, [place]: www.cdc.gov  [place] on Aging: www.[place].nih.gov  Contact a health care provider if:  You are afraid of falling at home.  You feel weak, drowsy, or dizzy at home.  You fall at home.  Summary  There are many simple things that you can do to make your home safe and to help prevent falls.  Ways to make your home safe include removing tripping hazards and installing grab bars in the bathroom.  Ask for help when making these changes in your home.  This information is not intended to replace advice given to you by your health care provider. Make sure you discuss any questions you have with your health care provider.  Document Revised: 09/19/2022 Document Reviewed: 07/21/2021  Elsevier Patient Education © 2023  [employer]vier Inc[employer]    Sit-to-Stand Exercise    The sit-to-stand exercise (also known as the chair stand or chair rise exercise) strengthens your lower body and helps you maintain or improve your mobility and independence. The end goal is to do the sit-to-stand exercise without using your hands. This will be easier as you become stronger. You should always talk with your health care provider before starting any exercise program, especially if you have had recent surgery.  Do the exercise exactly as told by your health care provider and adjust it as directed. It is normal to feel mild stretching, pulling, tightness, or discomfort as you do this exercise, but you should stop right away if you feel sudden pain or your pain gets worse. Do not begin doing this exercise until told by your health care provider.  What the sit-to-stand exercise does  The sit-to-stand exercise helps to strengthen the muscles in your thighs and the muscles in the center of your body that give you stability (core muscles). This exercise is especially helpful if:  You have had knee or hip surgery.  You have trouble getting up from a chair, out of a car, or off the toilet due to muscle weakness.  How to do the sit-to-stand exercise  Sit toward the front edge of a sturdy chair without armrests. Your knees should be bent and your feet should be flat on the floor and shoulder-width apart and underneath your hips.  Place your hands lightly on each side of the seat. Keep your back and neck as straight as possible, with your chest slightly forward.  Breathe in slowly. Lean forward and slightly shift your weight to the front of your feet.  Breathe out as you slowly stand up. Try not to support any weight with your hands.  Stand and pause for a full breath in and out.  Breathe in as you sit down slowly. Tighten your core and abdominal muscles to control your lowering as much as possible. You should lower yourself back to the chair slowly, not just drop back  into the seat.  Breathe out slowly.  Do this exercise 10-15 times. If needed, do it fewer times until you build up strength.  Rest for 1 minute, then do another set of 10-15 repetitions.  To change the difficulty of the sit-to-stand exercise  If the exercise is too difficult, use a chair with sturdy armrests, and push off the armrests to help you come to the standing position. You can also use the armrests to help slowly lower yourself back to sitting. As this gets easier, try to use your arms less. You can also place a firm cushion or pillow on the chair to make the surface higher.  If this exercise is too easy, do not use your arms to help raise or lower yourself. You can also wear a weighted vest, use hand weights, increase your repetitions, or try a lower chair.  General tips  You may feel tired when starting an exercise routine. This is normal.  You may have muscle soreness that lasts a few days. This is normal. As you get stronger, you may not feel muscle soreness.  Use smooth, steady movements.  Do not  hold your breath during strength exercises. This can cause unsafe changes in your blood pressure.  Breathe in slowly through your nose, and breathe out slowly through your mouth.  Summary  Strengthening your lower body is an important step to help you move safely and independently.  The sit-to-stand exercise helps strengthen the muscles in your thighs and core.  You should always talk with your health care provider before starting any exercise program, especially if you have had recent surgery.  This information is not intended to replace advice given to you by your health care provider. Make sure you discuss any questions you have with your health care provider.  Document Revised: 04/10/2022 Document Reviewed: 04/10/2022  Elsevier Patient Education © 2023 [employer]      Medicare Wellness  Personal Prevention Plan of Service     Date of Office Visit:    Encounter Provider:  Reema Nieto MD  Place of  Service:  [facility]IMARY [facility]  Patient Name: Emmie Ji  :  1955    As part of the Medicare Wellness portion of your visit today, we are providing you with this personalized preventive plan of services (PPPS). This plan is based upon recommendations of the [place] Preventive Services Task Force (USPSTF) and the Advisory Committee on Immunization Practices (ACIP).    This lists the preventive care services that should be considered, and provides dates of when you are due. Items listed as completed are up-to-date and do not require any further intervention.    Health Maintenance   Topic Date Due   • BMI FOLLOWUP  Never done   • MAMMOGRAM  2019   • LIPID PANEL  2021   • ANNUAL WELLNESS VISIT  2023   • ZOSTER VACCINE (1 of 2) 2024 (Originally 2005)   • Pneumococcal Vaccine 65+ (1 - PCV) 2024 (Originally 2020)   • TDAP/TD VACCINES (3 - Td or Tdap) 2024 (Originally 2022)   • COVID-19 Vaccine (1) 2024 (Originally 3/2/1956)   • COLORECTAL CANCER SCREENING  10/31/2027   • HEPATITIS C SCREENING  Completed   • PAP SMEAR  Discontinued   • DXA SCAN  Discontinued       No orders of the defined types were placed in this encounter.      No follow-ups on file.

## 2023-09-12 LAB
ALBUMIN SERPL-MCNC: 4.6 G/DL (ref 3.5–5.2)
ALBUMIN/GLOB SERPL: 2.6 G/DL
ALP SERPL-CCNC: 80 U/L (ref 39–117)
ALT SERPL-CCNC: 54 U/L (ref 1–33)
AST SERPL-CCNC: 37 U/L (ref 1–32)
BASOPHILS # BLD AUTO: 0.04 10*3/MM3 (ref 0–0.2)
BASOPHILS NFR BLD AUTO: 0.8 % (ref 0–1.5)
BILIRUB SERPL-MCNC: 0.6 MG/DL (ref 0–1.2)
BUN SERPL-MCNC: 11 MG/DL (ref 8–23)
BUN/CREAT SERPL: 13.9 (ref 7–25)
CALCIUM SERPL-MCNC: 10.2 MG/DL (ref 8.6–10.5)
CHLORIDE SERPL-SCNC: 107 MMOL/L (ref 98–107)
CHOLEST SERPL-MCNC: 264 MG/DL (ref 0–200)
CO2 SERPL-SCNC: 26.6 MMOL/L (ref 22–29)
CREAT SERPL-MCNC: 0.79 MG/DL (ref 0.57–1)
EGFRCR SERPLBLD CKD-EPI 2021: 81.6 ML/MIN/1.73
EOSINOPHIL # BLD AUTO: 0.19 10*3/MM3 (ref 0–0.4)
EOSINOPHIL NFR BLD AUTO: 3.7 % (ref 0.3–6.2)
ERYTHROCYTE [DISTWIDTH] IN BLOOD BY AUTOMATED COUNT: 12.7 % (ref 12.3–15.4)
GLOBULIN SER CALC-MCNC: 1.8 GM/DL
GLUCOSE SERPL-MCNC: 100 MG/DL (ref 65–99)
HCT VFR BLD AUTO: 41.2 % (ref 34–46.6)
HDLC SERPL-MCNC: 67 MG/DL (ref 40–60)
HGB BLD-MCNC: 14 G/DL (ref 12–15.9)
IMM GRANULOCYTES # BLD AUTO: 0.01 10*3/MM3 (ref 0–0.05)
IMM GRANULOCYTES NFR BLD AUTO: 0.2 % (ref 0–0.5)
LDLC SERPL CALC-MCNC: 176 MG/DL (ref 0–100)
LYMPHOCYTES # BLD AUTO: 1.78 10*3/MM3 (ref 0.7–3.1)
LYMPHOCYTES NFR BLD AUTO: 34.4 % (ref 19.6–45.3)
MCH RBC QN AUTO: 30.7 PG (ref 26.6–33)
MCHC RBC AUTO-ENTMCNC: 34 G/DL (ref 31.5–35.7)
MCV RBC AUTO: 90.4 FL (ref 79–97)
MONOCYTES # BLD AUTO: 0.47 10*3/MM3 (ref 0.1–0.9)
MONOCYTES NFR BLD AUTO: 9.1 % (ref 5–12)
NEUTROPHILS # BLD AUTO: 2.68 10*3/MM3 (ref 1.7–7)
NEUTROPHILS NFR BLD AUTO: 51.8 % (ref 42.7–76)
NRBC BLD AUTO-RTO: 0 /100 WBC (ref 0–0.2)
PLATELET # BLD AUTO: 230 10*3/MM3 (ref 140–450)
POTASSIUM SERPL-SCNC: 4.4 MMOL/L (ref 3.5–5.2)
PROT SERPL-MCNC: 6.4 G/DL (ref 6–8.5)
RBC # BLD AUTO: 4.56 10*6/MM3 (ref 3.77–5.28)
SODIUM SERPL-SCNC: 142 MMOL/L (ref 136–145)
TRIGL SERPL-MCNC: 119 MG/DL (ref 0–150)
VLDLC SERPL CALC-MCNC: 21 MG/DL (ref 5–40)
WBC # BLD AUTO: 5.17 10*3/MM3 (ref 3.4–10.8)

## 2024-09-05 ENCOUNTER — OFFICE VISIT (OUTPATIENT)
Dept: INTERNAL MEDICINE | Facility: CLINIC | Age: 69
End: 2024-09-05
Payer: MEDICARE

## 2024-09-05 VITALS
TEMPERATURE: 98.3 F | HEART RATE: 55 BPM | WEIGHT: 171.8 LBS | BODY MASS INDEX: 28.62 KG/M2 | OXYGEN SATURATION: 98 % | SYSTOLIC BLOOD PRESSURE: 138 MMHG | HEIGHT: 65 IN | DIASTOLIC BLOOD PRESSURE: 72 MMHG | RESPIRATION RATE: 18 BRPM

## 2024-09-05 DIAGNOSIS — E78.2 MIXED HYPERLIPIDEMIA: ICD-10-CM

## 2024-09-05 DIAGNOSIS — Z00.00 MEDICARE ANNUAL WELLNESS VISIT, SUBSEQUENT: Primary | ICD-10-CM

## 2024-09-05 DIAGNOSIS — R73.01 IMPAIRED FASTING GLUCOSE: ICD-10-CM

## 2024-09-05 DIAGNOSIS — F41.9 ANXIETY: ICD-10-CM

## 2024-09-05 PROCEDURE — 1159F MED LIST DOCD IN RCRD: CPT | Performed by: INTERNAL MEDICINE

## 2024-09-05 PROCEDURE — 99214 OFFICE O/P EST MOD 30 MIN: CPT | Performed by: INTERNAL MEDICINE

## 2024-09-05 PROCEDURE — 1126F AMNT PAIN NOTED NONE PRSNT: CPT | Performed by: INTERNAL MEDICINE

## 2024-09-05 PROCEDURE — G0439 PPPS, SUBSEQ VISIT: HCPCS | Performed by: INTERNAL MEDICINE

## 2024-09-05 PROCEDURE — 1160F RVW MEDS BY RX/DR IN RCRD: CPT | Performed by: INTERNAL MEDICINE

## 2024-09-05 NOTE — ASSESSMENT & PLAN NOTE
will obtain   fasting CMP and lipid panel.  Diet and exercise counseled, she declines statins at this time, last
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
## 2024-09-05 NOTE — PATIENT INSTRUCTIONS
Advance Care Planning and Advance Directives     You make decisions on a daily basis - decisions about where you want to live, your career, your home, your life. Perhaps one of the most important decisions you face is your choice for future medical care. Take time to talk with your family and your healthcare team and start planning today.  Advance Care Planning is a process that can help you:  Understand possible future healthcare decisions in light of your own experiences  Reflect on those decision in light of your goals and values  Discuss your decisions with those closest to you and the healthcare professionals that care for you  Make a plan by creating a document that reflects your wishes    Surrogate Decision Maker  In the event of a medical emergency, which has left you unable to communicate or to make your own decisions, you would need someone to make decisions for you.  It is important to discuss your preferences for medical treatment with this person while you are in good health.     Qualities of a surrogate decision maker:  Willing to take on this role and responsibility  Knows what you want for future medical care  Willing to follow your wishes even if they don't agree with them  Able to make difficult medical decisions under stressful circumstances    Advance Directives  These are legal documents you can create that will guide your healthcare team and decision maker(s) when needed. These documents can be stored in the electronic medical record.    Living Will - a legal document to guide your care if you have a terminal condition or a serious illness and are unable to communicate. States vary by statute in document names/types, but most forms may include one or more of the following:        -  Directions regarding life-prolonging treatments        -  Directions regarding artificially provided nutrition/hydration        -  Choosing a healthcare decision maker        -  Direction regarding organ/tissue  donation    Durable Power of  for Healthcare - this document names an -in-fact to make medical decisions for you, but it may also allow this person to make personal and financial decisions for you. Please seek the advice of an  if you need this type of document.    **Advance Directives are not required and no one may discriminate against you if you do not sign one.    Medical Orders  Many states allow specific forms/orders signed by your physician to record your wishes for medical treatment in your current state of health. This form, signed in personal communication with your physician, addresses resuscitation and other medical interventions that you may or may not want.      For more information or to schedule a time with a [facility] Advance Care Planning Facilitator contact: [place] or call [phone] and someone will contact you directly.    Medicare Wellness  Personal Prevention Plan of Service     Date of Office Visit:    Encounter Provider:  Reema Nieto MD  Place of Service:  [facility] PRIMARY [facility]ARE  Patient Name: Emmie Ji  :  1955    As part of the Medicare Wellness portion of your visit today, we are providing you with this personalized preventive plan of services (PPPS). This plan is based upon recommendations of the [place] Preventive Services Task Force (USPSTF) and the Advisory Committee on Immunization Practices (ACIP).    This lists the preventive care services that should be considered, and provides dates of when you are due. Items listed as completed are up-to-date and do not require any further intervention.    Health Maintenance   Topic Date Due   • BMI FOLLOWUP  2024   • LIPID PANEL  2024   • ZOSTER VACCINE (1 of 2) 2024 (Originally 2005)   • COVID-19 Vaccine (1 - -24 season) 2024 (Originally 2024)   • Pneumococcal Vaccine 65+ (1 of 1 - PCV) 2025 (Originally 2020)   •  MAMMOGRAM  09/05/2025 (Originally 12/9/2019)   • TDAP/TD VACCINES (3 - Td or Tdap) 09/05/2025 (Originally 7/26/2022)   • ANNUAL WELLNESS VISIT  09/05/2025   • COLORECTAL CANCER SCREENING  10/31/2027   • HEPATITIS C SCREENING  Completed   • PAP SMEAR  Discontinued   • DXA SCAN  Discontinued       No orders of the defined types were placed in this encounter.      Return in about 1 year (around 9/5/2025) for Medicare Wellness.

## 2024-09-06 LAB
ALBUMIN SERPL-MCNC: 4 G/DL (ref 3.5–5.2)
ALBUMIN/GLOB SERPL: 1.9 G/DL
ALP SERPL-CCNC: 88 U/L (ref 39–117)
ALT SERPL-CCNC: 51 U/L (ref 1–33)
AST SERPL-CCNC: 34 U/L (ref 1–32)
BILIRUB SERPL-MCNC: 0.5 MG/DL (ref 0–1.2)
BUN SERPL-MCNC: 15 MG/DL (ref 8–23)
BUN/CREAT SERPL: 18.1 (ref 7–25)
CALCIUM SERPL-MCNC: 9.7 MG/DL (ref 8.6–10.5)
CHLORIDE SERPL-SCNC: 107 MMOL/L (ref 98–107)
CHOLEST SERPL-MCNC: 264 MG/DL (ref 0–200)
CO2 SERPL-SCNC: 26 MMOL/L (ref 22–29)
CREAT SERPL-MCNC: 0.83 MG/DL (ref 0.57–1)
EGFRCR SERPLBLD CKD-EPI 2021: 76.4 ML/MIN/1.73
ERYTHROCYTE [DISTWIDTH] IN BLOOD BY AUTOMATED COUNT: 12.7 % (ref 12.3–15.4)
GLOBULIN SER CALC-MCNC: 2.1 GM/DL
GLUCOSE SERPL-MCNC: 95 MG/DL (ref 65–99)
HBA1C MFR BLD: 5.6 % (ref 4.8–5.6)
HCT VFR BLD AUTO: 42.6 % (ref 34–46.6)
HDLC SERPL-MCNC: 66 MG/DL (ref 40–60)
HGB BLD-MCNC: 14.1 G/DL (ref 12–15.9)
LDLC SERPL CALC-MCNC: 180 MG/DL (ref 0–100)
MCH RBC QN AUTO: 30.6 PG (ref 26.6–33)
MCHC RBC AUTO-ENTMCNC: 33.1 G/DL (ref 31.5–35.7)
MCV RBC AUTO: 92.4 FL (ref 79–97)
PLATELET # BLD AUTO: 210 10*3/MM3 (ref 140–450)
POTASSIUM SERPL-SCNC: 4.4 MMOL/L (ref 3.5–5.2)
PROT SERPL-MCNC: 6.1 G/DL (ref 6–8.5)
RBC # BLD AUTO: 4.61 10*6/MM3 (ref 3.77–5.28)
SODIUM SERPL-SCNC: 143 MMOL/L (ref 136–145)
TRIGL SERPL-MCNC: 105 MG/DL (ref 0–150)
VLDLC SERPL CALC-MCNC: 18 MG/DL (ref 5–40)
WBC # BLD AUTO: 5.4 10*3/MM3 (ref 3.4–10.8)